# Patient Record
Sex: FEMALE | Race: WHITE | Employment: FULL TIME | URBAN - METROPOLITAN AREA
[De-identification: names, ages, dates, MRNs, and addresses within clinical notes are randomized per-mention and may not be internally consistent; named-entity substitution may affect disease eponyms.]

---

## 2019-06-21 ENCOUNTER — OFFICE VISIT (OUTPATIENT)
Dept: FAMILY MEDICINE CLINIC | Age: 24
End: 2019-06-21
Payer: COMMERCIAL

## 2019-06-21 VITALS
WEIGHT: 142 LBS | HEIGHT: 62 IN | OXYGEN SATURATION: 99 % | BODY MASS INDEX: 26.13 KG/M2 | DIASTOLIC BLOOD PRESSURE: 74 MMHG | SYSTOLIC BLOOD PRESSURE: 121 MMHG | HEART RATE: 94 BPM

## 2019-06-21 DIAGNOSIS — R00.2 PALPITATIONS: ICD-10-CM

## 2019-06-21 DIAGNOSIS — I47.1 AVNRT (AV NODAL RE-ENTRY TACHYCARDIA) (HCC): ICD-10-CM

## 2019-06-21 DIAGNOSIS — Z00.00 ROUTINE GENERAL MEDICAL EXAMINATION AT A HEALTH CARE FACILITY: Primary | ICD-10-CM

## 2019-06-21 DIAGNOSIS — R01.1 SYSTOLIC MURMUR: ICD-10-CM

## 2019-06-21 PROCEDURE — 99385 PREV VISIT NEW AGE 18-39: CPT | Performed by: FAMILY MEDICINE

## 2019-06-21 ASSESSMENT — PATIENT HEALTH QUESTIONNAIRE - PHQ9
1. LITTLE INTEREST OR PLEASURE IN DOING THINGS: 0
SUM OF ALL RESPONSES TO PHQ QUESTIONS 1-9: 0
SUM OF ALL RESPONSES TO PHQ9 QUESTIONS 1 & 2: 0
SUM OF ALL RESPONSES TO PHQ QUESTIONS 1-9: 0
2. FEELING DOWN, DEPRESSED OR HOPELESS: 0

## 2019-06-21 NOTE — PATIENT INSTRUCTIONS
1) See when you received varicella vaccination(s). Let your PCP know by phone or mychart message. Tram  [address], [address], [address]  Phone: [phone] Fax: [phone]  Mon.-Fri. 7 a.m.-5 p.m. Sat. 8 a.m.-Noon    [facility]  [address]  [facility]  Phone: [phone]  Mon.-Fri. 7:30 a.m.-4 p.m. [facility]  [address], [facility] 70  Phone: [phone]    Fax: [phone]  Monday-Friday 8:00 a.m.- 4:30 p.m. 1418 [facility] and Ur[facility]  [facility], 1301 [facility], 6500 [facility] [address]  Phone: [phone]  Mon.-Fri. 8 a.m.-8 p.m. Sat. 9 a.m.-5 p.m. [address]  [facility]. [address]  Phone: [phone] Fax: [phone]  Mon.-Fri. 8 a.m.-5 p.m. [place]  [address] [place] [address]  Phone: [phone] Fax: [phone]  Mon.-Fri. 7:30 a.m.-4 p.m. [address], [address] [place] [address]  Phone: [phone]  Mon., Tue., Thu., Fri. 7:30 a.m.-5 p.m.  Kiesha Pereira. 7:30 [facility].-[facility]  [address] [facility] [address]  [address]  Phone: [phone] Fax: [phone]  Mon.-Fri. 7:30 a.m.-4 p.m. [facility]  [address]  Phone: [phone] Fax: [phone]  Mon.-Fri. 8 a.m.-4:30 p.m. 506 [facility] and [facility] Services  [address], 9[facility], 2550 [facility]  Phone: [phone] Fax: [phone]  Mon.-Fri. 8 a.m.-4:30 p.m. 1315 [facility] and [facility]  [address] [place] [address], [address]  Phone: [phone] Fax: [phone]  Mon.-Fri. 7 a.m.-5 p.m. Sat. 8 a.m.-4 p.m.     [address]  Phone: [phone]  Mon.-Fri. 7:30 a.m.-4 receiving high quality medical treatment.  Your time in completing this survey is greatly appreciated

## 2019-06-21 NOTE — PROGRESS NOTES
[facility]  Progress Note  Quan Barry DO          Raphael Rasheed  1995 06/21/19    Chief Complaint:   Raphael Rasheed is a 26 y.o. female who is here to establish    HPI:   In [employer] school. Known AVNRT. Takes metoprolol. Up to 120 bpm at times. Told during her PA physical exam skills that she has a grade II ADRIENNE. Sometimes experiencing more fatigue. She thinks it might be related to her stress and lack of restful sleep in [place] school. Metoprolol helps. ROS negative for headache, visionchanges, chest pain, shortness of breath, abdominal pain, urinary sx, bowel changes. Past medical, surgical, and social history reviewed. and allergies reviewed. Allergies   Allergen Reactions    Doxycycline Nausea And Vomiting     Prior to Visit Medications    Medication Sig Taking? Authorizing Provider   Norgestim-Eth Estrad Triphasic (TRI-SPRINTEC PO) Take by mouth Yes Historical Provider, MD   metoprolol tartrate (LOPRESSOR) 25 MG tablet Take 1 tablet by mouth 2 times daily as needed (fast heart rate.) Yes Justyn Shoulders Bingcang, DO          Vitals:    06/21/19 1353   BP: 121/74   Pulse: 94   SpO2: 99%   Weight: 142 lb (64.4 kg)   Height: 5' 2\" (1.575 m)      Wt Readings from Last 3 Encounters:   06/21/19 142 lb (64.4 kg)     BP Readings from Last 3 Encounters:   06/21/19 121/74       There is no problem list on file for this patient.       Immunization History   Administered Date(s) Administered    DTaP 1996, 02/03/1997, 04/17/1997, 11/09/1998, 08/22/2001    HIB PRP-T (ActHIB, Hiberix) 1996, 02/03/1997, 04/17/1997, 01/30/1998    HPV Quadrivalent (Gardasil) 08/22/2014    Hepatitis B 1995, 1995, 04/17/1997    MMR 10/16/1997, 08/22/2001    Meningococcal MPSV4 (Menomune) 08/22/2014    PPD Test 10/16/1997, 08/22/2001    Polio IPV (IPOL) 1996, 02/03/1997, 04/17/1997, 08/22/2001    Tdap (Boostrix, Adacel) 08/22/2014       Past Medical History: Diagnosis Date    AVNRT (AV eduar re-entry tachycardia) ([facility] 2016    Neutropenia ([facility] 2016    Scoliosis 2018    SI (sacroiliac) joint dysfunction 2018     Past Surgical History:   Procedure Laterality Date    LYMPH NODE DISSECTION  2016    2017    OTHER SURGICAL HISTORY  2016    electrophysiology    TONSILLECTOMY      TONSILLECTOMY AND ADENOIDECTOMY  2002     Family History   Problem Relation Age of Onset    Asthma Sister     Asthma Brother     COPD Maternal Grandmother     Heart Failure Maternal Grandmother     Prostate Cancer Maternal Grandfather     Cancer Maternal Grandfather     Breast Cancer Paternal Grandmother     Diabetes Paternal Grandfather      Social History     Socioeconomic History    Marital status: Single     Spouse name: Not on file    Number of children: Not on file    Years of education: Not on file    Highest education level: Not on file   Occupational History    Not on file   Social Needs    Financial resource strain: Not on file    Food insecurity:     Worry: Not on file     Inability: Not on file    Transportation needs:     Medical: Not on file     Non-medical: Not on file   Tobacco Use    Smoking status: Never Smoker    Smokeless tobacco: Never Used   Substance and Sexual Activity    Alcohol use: Yes     Comment: soc    Drug use: No    Sexual activity: Yes     Partners: Male   Lifestyle    Physical activity:     Days per week: Not on file     Minutes per session: Not on file    Stress: Not on file   Relationships    Social connections:     Talks on phone: Not on file     Gets together: Not on file     Attends Restoration service: Not on file     Active member of club or organization: Not on file     Attends meetings of clubs or organizations: Not on file     Relationship status: Not on file    Intimate partner violence:     Fear of current or ex partner: Not on file     Emotionally abused: Not on file     Physically abused: Not on file     Forced sexual [place], [address]  Phone: [phone]    Fax: [phone]  Monday-Friday 8:00 a.m.- 4:30 p.m. 1418 [facility] and [facility] Care  [address], [address] [facility], 6[address] [facility] [address]  Phone: [phone]  Mon.-Fri. 8 a.m.-8 p.m. Sat. 9 a.m.-5 p.m. 6900 [facility]  [address] [facility] [address]  [facility]. [address]  Phone: [phone] Fax: [phone]  Mon.-Fri. 8 a.m.-5 p.m. [place]  [address] [place] [address]  Phone: [phone] Fax: [phone]  Mon.-Fri. 7:30 a.m.-4 p.m. [address], [address] [place] [address]  Phone: [phone]  Mon., Tue., Thu., Fri. 7:30 a.m.-5 p.m.  Quinn Dodd. 7:30 a.m.-[facility]  [address]  Phone: [phone] Fax: [phone]  Mon.-Fri. 7:30 a.m.-4 p.m. [facility]  [address], [address]  Phone: [phone] Fax: [phone]  Mon.-Fri. 8 a.m.-4:30 p.m. 506 [facility] and [facility]  [facility], 914 [facility], [facility]  Phone: [phone] Fax: [phone]  Mon.-Fri. 8 a.m.-4:30 p.m. [address] [place] [address] and Urgent Care  [address] [place] [address]  [place], [address]  Phone: [phone] Fax: [phone]  Mon.-Fri. 7 a.m.-5 p.m. Sat. 8 a.m.-4 p.m. [address]  Phone: [phone]  Mon.-Fri. 7:30 a.m.-4 p.m. 3364 [facility]  1139 [facility]  Phone: [phone] Fax: [phone]  Mon.-Fri. 7 a.m.-5 p.m. Sat. 8 a.m.-Noon [facility] [place], [address] [facility]  Phone: [phone] Fax: [phone]  Mon.-Fri. 7 a.m.-5 p.m.     [facility]  [address] [facility], 400 [facility]  Phone: [phone] Fax: [phone]  Mon.-Fri. 7 a.m.-5 p.m. [address] [employer]  [address] [place] [address], [address] [place] [address]  Phone: [phone]  Mon.-Fri. 6:30 a.m.-6 p.m. Sat. 7 a.m.-1 p.m. [facility], [facility], [place], [facility]  Phone: [phone]  Open 24/7    [address], [address]  Phone: [phone]  Mon.-Fri. 6 a.m.-7 p.m. Sat. 7 a.m.-3 p.m. [facility]  [address] [facility], [facility]  Phone: [phone]  Mon.-Fri. 6 a.m.-11 p.m.  Sat.-Sun. 6:30 a.m.-11 p.m. [place]  [address] [place]  [address] [facility], [facility], [address] [facility]  Phone: [phone]  Mon.-Fri. 8 a.m.-4 p.m. [address] [place] [address]. [facility], [address] [place] [address]  Phone: [phone]  Open 24/7    [facility] 7045, [place] [address]  Phone: [phone]  Mon.-Fri. 6:30 a.m.-6:30 p.m. Sat. 8 a.m.-Noon    1301 [facility], [facility] 9043  Phone: [phone] Fax: [phone]  Mon.-Fri. 8:30 a.m.-5 p.m. [facility]  [address] [facility], 119 [facility]  Phone: [phone] Fax: [phone]  Mon.-Fri. 8 a.m.-4:30 p.m. Please call ahead to check hours. You may be contacted by mail or e-mail to participate in a patient satisfaction survey regarding your office visit today. We value your opinion and depend on your feedback to make improvements and provide you with the best possible experience while receiving high quality medical treatment. Your time in completing this survey is greatly appreciated          Please note a portion of this chart was generated using dragon dictation software. Although every effort was made to ensure the accuracy of this automated transcription,some errors in transcription may have occurred.

## 2019-06-28 DIAGNOSIS — Z00.00 ROUTINE GENERAL MEDICAL EXAMINATION AT A HEALTH CARE FACILITY: ICD-10-CM

## 2019-06-28 LAB
A/G RATIO: 1.5 (ref 1.1–2.2)
ALBUMIN SERPL-MCNC: 4.7 G/DL (ref 3.4–5)
ALP BLD-CCNC: 39 U/L (ref 40–129)
ALT SERPL-CCNC: 14 U/L (ref 10–40)
ANION GAP SERPL CALCULATED.3IONS-SCNC: 15 MMOL/L (ref 3–16)
AST SERPL-CCNC: 19 U/L (ref 15–37)
BASOPHILS ABSOLUTE: 0 K/UL (ref 0–0.2)
BASOPHILS RELATIVE PERCENT: 0.7 %
BILIRUB SERPL-MCNC: 0.3 MG/DL (ref 0–1)
BUN BLDV-MCNC: 12 MG/DL (ref 7–20)
CALCIUM SERPL-MCNC: 9.8 MG/DL (ref 8.3–10.6)
CHLORIDE BLD-SCNC: 102 MMOL/L (ref 99–110)
CHOLESTEROL, TOTAL: 171 MG/DL (ref 0–199)
CO2: 23 MMOL/L (ref 21–32)
CREAT SERPL-MCNC: 0.5 MG/DL (ref 0.6–1.1)
EOSINOPHILS ABSOLUTE: 0.1 K/UL (ref 0–0.6)
EOSINOPHILS RELATIVE PERCENT: 2 %
GFR AFRICAN AMERICAN: >60
GFR NON-AFRICAN AMERICAN: >60
GLOBULIN: 3.1 G/DL
GLUCOSE BLD-MCNC: 77 MG/DL (ref 70–99)
HCT VFR BLD CALC: 40.4 % (ref 36–48)
HDLC SERPL-MCNC: 70 MG/DL (ref 40–60)
HEMOGLOBIN: 14.2 G/DL (ref 12–16)
LDL CHOLESTEROL CALCULATED: 91 MG/DL
LYMPHOCYTES ABSOLUTE: 2.3 K/UL (ref 1–5.1)
LYMPHOCYTES RELATIVE PERCENT: 55.3 %
MCH RBC QN AUTO: 33.3 PG (ref 26–34)
MCHC RBC AUTO-ENTMCNC: 35.2 G/DL (ref 31–36)
MCV RBC AUTO: 94.6 FL (ref 80–100)
MONOCYTES ABSOLUTE: 0.3 K/UL (ref 0–1.3)
MONOCYTES RELATIVE PERCENT: 7.8 %
NEUTROPHILS ABSOLUTE: 1.4 K/UL (ref 1.7–7.7)
NEUTROPHILS RELATIVE PERCENT: 34.2 %
PDW BLD-RTO: 12.4 % (ref 12.4–15.4)
PLATELET # BLD: 222 K/UL (ref 135–450)
PMV BLD AUTO: 8.3 FL (ref 5–10.5)
POTASSIUM SERPL-SCNC: 3.9 MMOL/L (ref 3.5–5.1)
RBC # BLD: 4.27 M/UL (ref 4–5.2)
SODIUM BLD-SCNC: 140 MMOL/L (ref 136–145)
TOTAL PROTEIN: 7.8 G/DL (ref 6.4–8.2)
TRIGL SERPL-MCNC: 49 MG/DL (ref 0–150)
TSH REFLEX: 1.26 UIU/ML (ref 0.27–4.2)
VLDLC SERPL CALC-MCNC: 10 MG/DL
WBC # BLD: 4.1 K/UL (ref 4–11)

## 2019-07-09 ENCOUNTER — HOSPITAL ENCOUNTER (OUTPATIENT)
Dept: NON INVASIVE DIAGNOSTICS | Age: 24
Discharge: HOME | End: 2019-07-09
Payer: COMMERCIAL

## 2019-07-09 LAB
LV EF: 53 %
LVEF MODALITY: NORMAL

## 2019-07-09 PROCEDURE — 93306 TTE W/DOPPLER COMPLETE: CPT

## 2019-07-10 ENCOUNTER — PATIENT MESSAGE (OUTPATIENT)
Dept: FAMILY MEDICINE CLINIC | Age: 24
End: 2019-07-10

## 2019-07-10 DIAGNOSIS — I49.9 IRREGULAR HEART BEAT: Primary | ICD-10-CM

## 2019-07-10 DIAGNOSIS — R42 POSTURAL DIZZINESS WITH PRESYNCOPE: ICD-10-CM

## 2019-07-10 DIAGNOSIS — R55 POSTURAL DIZZINESS WITH PRESYNCOPE: ICD-10-CM

## 2019-07-16 ENCOUNTER — HOSPITAL ENCOUNTER (OUTPATIENT)
Dept: NON INVASIVE DIAGNOSTICS | Age: 24
Discharge: HOME | End: 2019-07-16
Payer: COMMERCIAL

## 2019-07-16 DIAGNOSIS — I49.9 IRREGULAR HEART BEAT: ICD-10-CM

## 2019-07-16 PROCEDURE — 93225 XTRNL ECG REC<48 HRS REC: CPT

## 2019-07-16 PROCEDURE — 93226 XTRNL ECG REC<48 HR SCAN A/R: CPT

## 2019-07-25 ENCOUNTER — OFFICE VISIT (OUTPATIENT)
Dept: CARDIOLOGY CLINIC | Age: 24
End: 2019-07-25
Payer: COMMERCIAL

## 2019-07-25 VITALS
DIASTOLIC BLOOD PRESSURE: 70 MMHG | BODY MASS INDEX: 25.79 KG/M2 | WEIGHT: 141 LBS | HEART RATE: 115 BPM | SYSTOLIC BLOOD PRESSURE: 132 MMHG

## 2019-07-25 DIAGNOSIS — R00.0 TACHYCARDIA: ICD-10-CM

## 2019-07-25 DIAGNOSIS — I48.91 ATRIAL FIBRILLATION, UNSPECIFIED TYPE (HCC): Primary | ICD-10-CM

## 2019-07-25 PROCEDURE — 99203 OFFICE O/P NEW LOW 30 MIN: CPT | Performed by: INTERNAL MEDICINE

## 2019-07-25 PROCEDURE — 93000 ELECTROCARDIOGRAM COMPLETE: CPT | Performed by: INTERNAL MEDICINE

## 2019-07-25 NOTE — ASSESSMENT & PLAN NOTE
Had AVNRT ablation  Symptoms somewhat concerning for POTS  Will get tilt table  Continue metoprolol (may consider Nadolol)  ECG today sinus tach  Consider EP referral

## 2019-07-25 NOTE — LETTER
[facility]  EP Procedure Sheet  7/25/19    Art Lucero  1995    Physician: Dr. Brandon Wong     EP Procedures   Pacemaker implant  EP Study    ICD implant  Atrial flutter ablation     Biv implant  Atrial fibrillation ablation    Generator Change  SVT ablation    Lead revision  VT ablation    Lead extraction +/- upgrade  AVN ablation    Loop implant  Cardioversion    X Other: Tilt Table Test   CHERYL     Equipment   Medtronic   TRIXIE Mapping System    [place]  [address] [address]  CryoAblation    Biotronik  Laser Lead Extraction    Special Equipment       EP Procedures Scheduling Request  Time Requested  3:30   Specific Day 7/30/19   Anesthesia    CT surgery backup    Location [facility]     Pre-Procedure Labs / Imaging   PT/INR  Type & cross    CBC  Units PRBC    BMP/Mg  Units FFP    Venogram  CXR    Echo  Pulmonary CTA for Pulmonary vein mapping   .     Date of last admission: > 30 days

## 2019-07-25 NOTE — Clinical Note
She may have POTS, which is somewhat common after AVNRT ablation especially in younger patients. I asked her to take metoprolol every day, we will be getting a tilt table test and depending on what we find we may switch it to nadolol.  Thank you

## 2019-07-26 ENCOUNTER — PREP FOR PROCEDURE (OUTPATIENT)
Dept: CARDIOLOGY CLINIC | Age: 24
End: 2019-07-26

## 2019-07-26 LAB
ACQUISITION DURATION: NORMAL S
AVERAGE HEART RATE: 81 BPM
EKG DIAGNOSIS: NORMAL
HOLTER MAX HEART RATE: 160 BPM
HOOKUP DATE: NORMAL
HOOKUP TIME: NORMAL
MAX HEART RATE TIME/DATE: NORMAL
MIN HEART RATE TIME/DATE: NORMAL
MIN HEART RATE: 49 BPM
NUMBER OF QRS COMPLEXES: NORMAL
NUMBER OF SUPRAVENTRICULAR BEATS IN RUNS: 0
NUMBER OF SUPRAVENTRICULAR COUPLETS: 11
NUMBER OF SUPRAVENTRICULAR ECTOPICS: 59
NUMBER OF SUPRAVENTRICULAR ISOLATED BEATS: 37
NUMBER OF SUPRAVENTRICULAR RUNS: 0
NUMBER OF VENTRICULAR BEATS IN RUNS: 0
NUMBER OF VENTRICULAR BIGEMINAL CYCLES: 0
NUMBER OF VENTRICULAR COUPLETS: 0
NUMBER OF VENTRICULAR ECTOPICS: 9
NUMBER OF VENTRICULAR ISOLATED BEATS: 9
NUMBER OF VENTRICULAR RUNS: 0

## 2019-07-26 ASSESSMENT — ENCOUNTER SYMPTOMS
SHORTNESS OF BREATH: 0
BACK PAIN: 0
WHEEZING: 0
FACIAL SWELLING: 0
CHEST TIGHTNESS: 0
ABDOMINAL PAIN: 0
EYE DISCHARGE: 0
ABDOMINAL DISTENTION: 0
COUGH: 0
COLOR CHANGE: 0
BLOOD IN STOOL: 0
VOMITING: 0

## 2019-07-30 ENCOUNTER — HOSPITAL ENCOUNTER (OUTPATIENT)
Dept: CARDIAC CATH/INVASIVE PROCEDURES | Age: 24
Discharge: HOME | End: 2019-07-30
Attending: INTERNAL MEDICINE | Admitting: INTERNAL MEDICINE
Payer: COMMERCIAL

## 2019-07-30 VITALS — TEMPERATURE: 98 F | HEIGHT: 62 IN | BODY MASS INDEX: 25.95 KG/M2 | WEIGHT: 141 LBS

## 2019-07-30 PROCEDURE — 93660 TILT TABLE EVALUATION: CPT | Performed by: INTERNAL MEDICINE

## 2019-07-30 PROCEDURE — 93005 ELECTROCARDIOGRAM TRACING: CPT

## 2019-07-30 PROCEDURE — 93660 TILT TABLE EVALUATION: CPT

## 2019-07-31 LAB
EKG ATRIAL RATE: 94 BPM
EKG DIAGNOSIS: NORMAL
EKG P AXIS: 45 DEGREES
EKG P-R INTERVAL: 140 MS
EKG Q-T INTERVAL: 378 MS
EKG QRS DURATION: 84 MS
EKG QTC CALCULATION (BAZETT): 472 MS
EKG R AXIS: 71 DEGREES
EKG T AXIS: 42 DEGREES
EKG VENTRICULAR RATE: 94 BPM

## 2019-08-08 ENCOUNTER — OFFICE VISIT (OUTPATIENT)
Dept: CARDIOLOGY CLINIC | Age: 24
End: 2019-08-08
Payer: COMMERCIAL

## 2019-08-08 VITALS
SYSTOLIC BLOOD PRESSURE: 130 MMHG | OXYGEN SATURATION: 98 % | BODY MASS INDEX: 25.91 KG/M2 | HEART RATE: 91 BPM | HEIGHT: 62 IN | DIASTOLIC BLOOD PRESSURE: 68 MMHG | WEIGHT: 140.8 LBS

## 2019-08-08 DIAGNOSIS — G90.A POTS (POSTURAL ORTHOSTATIC TACHYCARDIA SYNDROME): ICD-10-CM

## 2019-08-08 DIAGNOSIS — R00.0 TACHYCARDIA: ICD-10-CM

## 2019-08-08 PROCEDURE — 99213 OFFICE O/P EST LOW 20 MIN: CPT | Performed by: INTERNAL MEDICINE

## 2019-08-08 ASSESSMENT — ENCOUNTER SYMPTOMS
CHEST TIGHTNESS: 0
BACK PAIN: 0
COUGH: 0
FACIAL SWELLING: 0
ABDOMINAL DISTENTION: 0
SHORTNESS OF BREATH: 0
BLOOD IN STOOL: 0
VOMITING: 0
ABDOMINAL PAIN: 0
COLOR CHANGE: 0
EYE DISCHARGE: 0
WHEEZING: 0

## 2019-08-08 NOTE — PROGRESS NOTES
06/28/2019     Lab Results   Component Value Date    LDLCALC 91 06/28/2019     Lab Results   Component Value Date    LABVLDL 10 06/28/2019     No results found for: CHOLHDLRATIO    No results found for: INR, PROTIME    The ASCVD Risk score (Rocio Griffin, et al., 2013) failed to calculate for the following reasons: The 2013 ASCVD risk score is only valid for ages 36 to 78      Imaging:       Last ECG (if available):  Sinus tach    Last Stress (if available):    Last Cath (if available):    Last TTE/CHERYL(if available): 7/9/19  Summary   Left ventricular cavity size is normal. Normal left ventricular wall   thickness. Overall left ventricular systolic function appears low normal   with an ejection fraction of 50-55%. No regional wall motion abnormalities   are noted. Normal diastolic function. Trivial tricuspid regurgitation.   Estimated pulmonary artery systolic pressure is at 25 mmHg assuming a right   atrial pressure of 3 mmHg. Last CMR  (if available):      Assessment / Plan:     Tachycardia  inappropriate sinus tach  Continue metoprolol    POTS (postural orthostatic tachycardia syndrome)  Continue Metoprolol 25mg bid  Stable  Referral to [facility] for possible EDS. TTE ok. I had the opportunity to review the clinical symptoms and presentation of Jina Ivory. Tobacco use was discussed with the patient and educated on the negative effects. I have asked the patient to not utilize these agents. All questions and concerns were addressed to the patient/family. Alternatives to my treatment were discussed. The note was completed using EMR. Every effort wasmade to ensure accuracy; however, inadvertent computerized transcription errors may be present. Thank you for allowing me to participate in thescarlett or Estefania Mccoy MD, [facility]

## 2019-08-12 ENCOUNTER — TELEPHONE (OUTPATIENT)
Dept: CARDIOLOGY CLINIC | Age: 24
End: 2019-08-12

## 2019-08-26 ENCOUNTER — PATIENT MESSAGE (OUTPATIENT)
Dept: FAMILY MEDICINE CLINIC | Age: 24
End: 2019-08-26

## 2019-08-26 RX ORDER — NORGESTIMATE AND ETHINYL ESTRADIOL 7DAYSX3 28
1 KIT ORAL DAILY
Qty: 28 TABLET | Refills: 5 | Status: SHIPPED | OUTPATIENT
Start: 2019-08-26 | End: 2020-02-10

## 2020-02-10 RX ORDER — NORGESTIMATE AND ETHINYL ESTRADIOL 7DAYSX3 28
1 KIT ORAL DAILY
Qty: 28 TABLET | Refills: 0 | Status: SHIPPED | OUTPATIENT
Start: 2020-02-10 | End: 2020-04-13

## 2020-02-10 RX ORDER — NORGESTIMATE AND ETHINYL ESTRADIOL 7DAYSX3 28
KIT ORAL
Qty: 28 TABLET | Refills: 0 | Status: SHIPPED | OUTPATIENT
Start: 2020-02-10 | End: 2020-02-10

## 2020-02-13 ASSESSMENT — ENCOUNTER SYMPTOMS
CHEST TIGHTNESS: 0
COLOR CHANGE: 0
COUGH: 0
VOMITING: 0
ABDOMINAL PAIN: 0
BLOOD IN STOOL: 0
FACIAL SWELLING: 0
EYE DISCHARGE: 0
WHEEZING: 0
ABDOMINAL DISTENTION: 0
SHORTNESS OF BREATH: 0
BACK PAIN: 0

## 2020-02-13 NOTE — PROGRESS NOTES
Norgestim-Eth Estrad Triphasic (TRI-SPRINTEC) 0.18/0.215/0.25 MG-35 MCG TABS Take 1 tablet by mouth daily 2/10/20  Yes Cintia Holcomb, DO   metoprolol tartrate (LOPRESSOR) 25 MG tablet TAKE 1 TABLET BY MOUTH TWICE DAILY AS NEEDED FOR FAST HEART RATE 12/19/19  Yes Cintia Polly Zhang, DO   Elastic Bandages & Supports ([address] ) [address] [facility] 1 each by Does not apply route daily 7/25/19  Yes Carlo Tamez MD        Review of Systems   Constitutional: Negative for activity change, appetite change, diaphoresis, fatigue, fever and unexpected weight change. HENT: Negative for congestion, facial swelling, mouth sores and nosebleeds. Eyes: Negative for discharge and visual disturbance. Respiratory: Negative for cough, chest tightness, shortness of breath and wheezing. Cardiovascular: Negative for chest pain, palpitations and leg swelling. Gastrointestinal: Negative for abdominal distention, abdominal pain, blood in stool and vomiting. Endocrine: Negative for cold intolerance, heat intolerance and polyuria. Genitourinary: Negative for difficulty urinating, dysuria, frequency and hematuria. Musculoskeletal: Negative for back pain, joint swelling, myalgias and neck pain. Skin: Negative for color change, pallor and rash. Allergic/Immunologic: Negative for immunocompromised state. Neurological: Negative for dizziness, syncope, weakness, light-headedness, numbness and headaches. Hematological: Negative for adenopathy. Does not bruise/bleed easily. Psychiatric/Behavioral: Negative for behavioral problems, confusion, decreased concentration and suicidal ideas. The patient is not nervous/anxious.         Vitals:    02/19/20 1137   BP: 110/70   Pulse: 73   SpO2:     Weight: 138 lb 3.2 oz (62.7 kg)       Vitals:    02/19/20 1125 02/19/20 1136 02/19/20 1137   BP: 112/68 (!) 110/90 110/70   Site: Left Upper Arm Left Upper Arm Left Upper Arm   Position: Supine Sitting Standing

## 2020-02-19 ENCOUNTER — OFFICE VISIT (OUTPATIENT)
Dept: CARDIOLOGY CLINIC | Age: 25
End: 2020-02-19
Payer: COMMERCIAL

## 2020-02-19 ENCOUNTER — NURSE ONLY (OUTPATIENT)
Dept: CARDIOLOGY CLINIC | Age: 25
End: 2020-02-19

## 2020-02-19 VITALS
HEIGHT: 61 IN | SYSTOLIC BLOOD PRESSURE: 110 MMHG | OXYGEN SATURATION: 97 % | BODY MASS INDEX: 26.09 KG/M2 | DIASTOLIC BLOOD PRESSURE: 70 MMHG | WEIGHT: 138.2 LBS | HEART RATE: 73 BPM

## 2020-02-19 PROBLEM — R00.2 PALPITATIONS: Status: ACTIVE | Noted: 2020-02-19

## 2020-02-19 PROCEDURE — 93000 ELECTROCARDIOGRAM COMPLETE: CPT | Performed by: INTERNAL MEDICINE

## 2020-02-19 PROCEDURE — 99214 OFFICE O/P EST MOD 30 MIN: CPT | Performed by: INTERNAL MEDICINE

## 2020-02-19 NOTE — ASSESSMENT & PLAN NOTE
Complains of increased in frequency and duration of palpitations. One episode where she felt she was close to passing out but not related to her prior symptoms related to POTS  Given her age and symptoms we will proceed with 1 month monitor. Continue metoprolol.

## 2020-02-27 ENCOUNTER — TELEPHONE (OUTPATIENT)
Dept: FAMILY MEDICINE CLINIC | Age: 25
End: 2020-02-27

## 2020-03-19 ENCOUNTER — OFFICE VISIT (OUTPATIENT)
Dept: FAMILY MEDICINE CLINIC | Age: 25
End: 2020-03-19
Payer: COMMERCIAL

## 2020-03-19 VITALS
HEART RATE: 104 BPM | TEMPERATURE: 98.2 F | BODY MASS INDEX: 25.73 KG/M2 | DIASTOLIC BLOOD PRESSURE: 73 MMHG | OXYGEN SATURATION: 99 % | RESPIRATION RATE: 18 BRPM | SYSTOLIC BLOOD PRESSURE: 122 MMHG | WEIGHT: 136.2 LBS

## 2020-03-19 PROCEDURE — 46600 DIAGNOSTIC ANOSCOPY SPX: CPT | Performed by: FAMILY MEDICINE

## 2020-03-19 PROCEDURE — 99213 OFFICE O/P EST LOW 20 MIN: CPT | Performed by: FAMILY MEDICINE

## 2020-03-19 RX ORDER — FAMOTIDINE 10 MG
40 TABLET ORAL 2 TIMES DAILY
COMMUNITY
End: 2021-02-25 | Stop reason: ALTCHOICE

## 2020-03-19 RX ORDER — CETIRIZINE HYDROCHLORIDE 10 MG/1
10 TABLET ORAL DAILY
COMMUNITY

## 2020-03-19 RX ORDER — HYDROCORTISONE ACETATE 25 MG/1
25 SUPPOSITORY RECTAL EVERY 12 HOURS
Qty: 20 SUPPOSITORY | Refills: 0 | Status: SHIPPED | OUTPATIENT
Start: 2020-03-19 | End: 2020-06-25 | Stop reason: SDUPTHER

## 2020-03-19 RX ORDER — DOCUSATE SODIUM 100 MG/1
100 CAPSULE, LIQUID FILLED ORAL DAILY
COMMUNITY
End: 2021-02-25 | Stop reason: ALTCHOICE

## 2020-03-19 NOTE — PROGRESS NOTES
Patient is here for issues with rectal bleeding and itching X 5 days. She has h/o anal  Fissures X 2 years , with intermittent bleeding - 3-4 days per week. H/o colonoscopy in 2016 in [facility]- normal or external hemorrhoids  Her bowel movements are usually 1x/day . Takes Colace once per day . She has h/o EDS. She is on H2 blocker - Zyrtec and Pepcid for Hives . Occasional heartburn/ indigestion , but usually attributes to alcohol. Cousin has Crohn's . Has used over the counter Hydrocortisone over the counter and Preparation H , but not every day and not with this flare. No fever. No abdominal pain. History of LAGOS , but  Stable. Has 1 coffee/ day. H/o sinus tachycardia. Last bowel movement was last night with slight blood with wiping only . None in toilet or in underwear. No significant weight changes. Review of Systems    ROS: All other systems were reviewed and are negative . Patient's allergies and medications were reviewed. Patient's past medical, surgical, social , and family history were reviewed. Wt Readings from Last 3 Encounters:   03/19/20 136 lb 3.2 oz (61.8 kg)   02/19/20 138 lb 3.2 oz (62.7 kg)   08/08/19 140 lb 12.8 oz (63.9 kg)     OBJECTIVE:  /73   Pulse 104   Temp 98.2 °F (36.8 °C) (Oral)   Resp 18   Wt 136 lb 3.2 oz (61.8 kg)   LMP 03/15/2020 (Exact Date)   SpO2 99%   Breastfeeding No   BMI 25.73 kg/m²     Physical Exam    General: NAD, cooperative, alert and oriented X 3. Mood / affect is good. good insight. well hydrated. Neck : no lymphadenopathy, supple, FROM  CV: Regular rate and rhythm , no murmurs/ rub/ gallop. No edema. Lungs : CTA bilaterally, breathing comfortably  Abdomen: positive bowel sounds, soft , non tender, non distended. No hepatosplenomegaly. No CVA tenderness. Rectal: anoscopy showed internal hemorrhoids . No active bleeding. No external hemorrhoids seen. Skin: no rashes. Non tender. ASSESSMENT/  PLAN:  1.  Internal hemorrhoids/2. Rectal bleeding  - push fluids - water. Add fiber supplement daily- Fibercon or Metamucil qd. - hydrocortisone (ANUSOL-HC) 25 MG suppository; Place 1 suppository rectally every 12 hours for 10 days  Dispense: 20 suppository; Refill: 0  - 47806 - [place] DIAGNOSTIC ANOSCOPY    F/u if no improvement 7-10d/ prn increased symptoms.

## 2020-03-19 NOTE — PATIENT INSTRUCTIONS
Patient Education        Hemorrhoids: Care Instructions  Your Care Instructions    Hemorrhoids are enlarged veins that develop in the anal canal. Bleeding during bowel movements, itching, swelling, and rectal pain are the most common symptoms. They can be uncomfortable at times, but hemorrhoids rarely are a serious problem. You can treat most hemorrhoids with simple changes to your diet and bowel habits. These changes include eating more fiber and not straining to pass stools. Most hemorrhoids do not need surgery or other treatment unless they are very large and painful or bleed a lot. Follow-up care is a key part of your treatment and safety. Be sure to make and go to all appointments, and call your doctor if you are having problems. It's also a good idea to know your test results and keep a list of the medicines you take. How can you care for yourself at home? · Sit in a few inches of warm water (sitz bath) 3 times a day and after bowel movements. The warm water helps with pain and itching. · Put ice on your anal area several times a day for 10 minutes at a time. Put a thin cloth between the ice and your skin. Follow this by placing a warm, wet towel on the area for another 10 to 20 minutes. · Take pain medicines exactly as directed. ? If the doctor gave you a prescription medicine for pain, take it as prescribed. ? If you are not taking a prescription pain medicine, ask your doctor if you can take an over-the-counter medicine. · Keep the anal area clean, but be gentle. Use water and a fragrance-free soap, such as [place], or use baby wipes or medicated pads, such as Tucks. · Wear cotton underwear and loose clothing to decrease moisture in the anal area. · Eat more fiber. Include foods such as whole-grain breads and cereals, raw vegetables, raw and dried fruits, and beans. · Drink plenty of fluids, enough so that your urine is light yellow or clear like water.  If you have kidney, heart, or liver disease and have to limit fluids, talk with your doctor before you increase the amount of fluids you drink. · Use a stool softener that contains bran or psyllium. You can save money by buying bran or psyllium (available in bulk at most health food stores) and sprinkling it on foods or stirring it into fruit juice. Or you can use a product such as Metamucil or Hydrocil. · Practice healthy bowel habits. ? Go to the bathroom as soon as you have the urge. ? Avoid straining to pass stools. Relax and give yourself time to let things happen naturally. ? Do not hold your breath while passing stools. ? Do not read while sitting on the toilet. Get off the toilet as soon as you have finished. · Take your medicines exactly as prescribed. Call your doctor if you think you are having a problem with your medicine. When should you call for help? Call 911 anytime you think you may need emergency care. For example, call if:    · You pass maroon or very bloody stools.    Call your doctor now or seek immediate medical care if:    · You have increased pain.     · You have increased bleeding.    Watch closely for changes in your health, and be sure to contact your doctor if:    · Your symptoms have not improved after 3 or 4 days. Where can you learn more? Go to [email or web address]://[email or web address]. org and sign in to your [employer] account. Enter U353 in the [facility] box to learn more about \"Hemorrhoids: Care Instructions. \"     If you do not have an account, please click on the \"Sign Up Now\" link. Current as of: August 11, 2019Content Version: 12.4  © 8202-8478 Healthwise, Incorporated. Care instructions adapted under license by [facility] ([facility]). If you have questions about a medical condition or this instruction, always ask your healthcare professional. [place] [address] any warranty or liability for your use of this information.

## 2020-03-26 PROCEDURE — 93272 ECG/REVIEW INTERPRET ONLY: CPT | Performed by: INTERNAL MEDICINE

## 2020-04-14 ENCOUNTER — OFFICE VISIT (OUTPATIENT)
Dept: CARDIOLOGY CLINIC | Age: 25
End: 2020-04-14
Payer: COMMERCIAL

## 2020-04-14 ENCOUNTER — NURSE ONLY (OUTPATIENT)
Dept: CARDIOLOGY CLINIC | Age: 25
End: 2020-04-14

## 2020-04-14 VITALS
SYSTOLIC BLOOD PRESSURE: 118 MMHG | WEIGHT: 137 LBS | TEMPERATURE: 98.2 F | DIASTOLIC BLOOD PRESSURE: 80 MMHG | HEART RATE: 106 BPM | BODY MASS INDEX: 25.86 KG/M2 | HEIGHT: 61 IN

## 2020-04-14 PROCEDURE — 93000 ELECTROCARDIOGRAM COMPLETE: CPT | Performed by: INTERNAL MEDICINE

## 2020-04-14 PROCEDURE — 0296T PR EXT ECG > 48HR TO 21 DAY RCRD W/CONECT INTL RCRD: CPT | Performed by: INTERNAL MEDICINE

## 2020-04-14 PROCEDURE — 99204 OFFICE O/P NEW MOD 45 MIN: CPT | Performed by: INTERNAL MEDICINE

## 2020-04-14 NOTE — PROGRESS NOTES
ADENOIDECTOMY  2001       Allergies: Allergies   Allergen Reactions    Doxycycline Nausea And Vomiting    Gold Other (See Comments), Hives and Rash     Other reaction(s): Skin Discoloration  urticaria         Medication:   Prior to Admission medications    Medication Sig Start Date End Date Taking? Authorizing Provider   cetirizine (ZYRTEC) 10 MG tablet Take 10 mg by mouth daily   Yes Historical Provider, MD   famotidine (PEPCID) 10 MG tablet Take 40 mg by mouth 2 times daily   Yes Historical Provider, MD   docusate sodium (COLACE) 100 MG capsule Take 100 mg by mouth Daily   Yes Historical Provider, MD   Norgestim-Eth Estrad Triphasic (TRI-SPRINTEC) 0.18/0.215/0.25 MG-35 MCG TABS Take 1 tablet by mouth daily 2/10/20  Yes Tatayna Morrell, DO   metoprolol tartrate (LOPRESSOR) 25 MG tablet TAKE 1 TABLET BY MOUTH TWICE DAILY AS NEEDED FOR FAST HEART RATE 19  Yes Jerman Holcomb, DO   Elastic Bandages & Supports ([facility]) [facility] 1 each by Does not apply route daily 19  Yes Анна Barcenas MD       Social History:   reports that she has never smoked. She has never used smokeless tobacco. She reports current alcohol use. She reports that she does not use drugs. Family History:  family history includes Asthma in her brother and sister; Breast Cancer in her paternal grandmother; COPD in her maternal grandmother; Cancer in her maternal grandfather; Diabetes in her paternal grandfather; Heart Failure in her maternal grandmother; Prostate Cancer in her maternal grandfather. Reviewed.  Denies family history of sudden cardiac death, arrhythmia, premature CAD    Review of System:    · General ROS: negative for - chills, fever   · Psychological ROS: negative for - anxiety or depression  · Ophthalmic ROS: negative for - eye pain or loss of vision  · ENT ROS: negative for - epistaxis, headaches, nasal discharge, sore throat   · Allergy and Immunology ROS: negative for - hives, nasal congestion   · Hematological and Lymphatic ROS: negative for - bleeding problems, blood clots, bruising or jaundice  · Endocrine ROS: negative for - skin changes, temperature intolerance or unexpected weight changes  · Respiratory ROS: negative for - cough, hemoptysis, pleuritic pain, SOB, sputum changes or wheezing  · Cardiovascular ROS: Per HPI. · Gastrointestinal ROS: negative for - abdominal pain, blood in stools, diarrhea, hematemesis, melena, nausea/vomiting or swallowing difficulty/pain  · Genito-Urinary ROS: negative for - dysuria or incontinence  · Musculoskeletal ROS: negative for - joint swelling or muscle pain  · Neurological ROS: negative for - confusion, dizziness, gait disturbance, headaches, numbness/tingling, seizures, speech problems, tremors, visual changes or weakness  · Dermatological ROS: negative for - rash    Physical Examination:  Vitals:    04/14/20 1427   BP: 118/80   Temp: 98.2 °F (36.8 °C)       · Constitutional: Oriented. No distress. · Head: Normocephalic and atraumatic. · Mouth/Throat: Oropharynx is clear and moist.   · Eyes: Conjunctivae normal. EOM are normal.   · Neck: Normal range of motion. Neck supple. No rigidity. No JVD present. · Cardiovascular: Normal rate, regular rhythm, S1&S2 and intact distal pulses. · Pulmonary/Chest: Bilateral respiratory sounds. No wheezes. No rhonchi. · -Abdominal: Soft. Bowel sounds present. No distension, No tenderness. · Musculoskeletal: No tenderness. No edema    · Lymphadenopathy: Has no cervical adenopathy. · Neurological: Alert and oriented. Cranial nerve appears intact, No Gross deficit   · Skin: Skin is warm and dry. No rash noted. · Psychiatric: Has a normal mood, affect and behavior     Labs:  Reviewed. ECG: reviewed, Sinus  Rhythm, with QRS duration 62 ms. No pathologic Q waves, ventricular pre-excitation, or QT prolongation. Studies:   1.  Holter (7/16/19):  SR/SA with average HR 81 (), rare PAC's, rare PVC's      30 day Event monitor (2/19-3/20/20):  SR with unifocal, symptomatic PVC's, blocked PACs, and nonsustained SVT    2. Echo 7/9/19:    Summary   Left ventricular cavity size is normal. Normal left ventricular wall   thickness. Overall left ventricular systolic function appears low normal   with an ejection fraction of 50-55%. No regional wall motion abnormalities   are noted. Normal diastolic function. Trivial tricuspid regurgitation. Estimated pulmonary artery systolic pressure is at 25 mmHg assuming a right   atri/al pressure of 3 mmHg. 3. Stress Test:  none    4. Cath:  none    The MCOT, echocardiogram, stress test, and coronary angiography/PCI were reviewed by myself and used for my plan of care. Procedures:  1. Assessment/Plan:  1. Syncope  2. Palpitations  3. POTS  4.  AVNRT s/p ablation 2017  5. PVC's    She had a slow pathway modification in 2017. In addition to this, she was also noted to have very sensitive sinus node during the procedure. Currently, her symptoms are a bit different and more consistent with PVCs. Monitor showed unifocal, symptomatic PVC's. Unable to determine burden as this was an event monitor. Obtain 14 day Zio to assess PVC burden, morphology, and behavior. Start Verapamil 120 mg daily as they are mostly outflow tract morphology. Discussed about pots and vasovagal symptoms in detail. Discussed about the lifestyle modifications. Repeat echo in 2 months prior to OV     Follow up in 2 months    Thank you for allowing me to participate in the care of Hilda Odonnell. All questions and concerns were addressed to the patient/family. Alternatives to my treatment were discussed. Leonides Habermann, RN, am scribing for and in the presence of Dr. Adolph Noland.  04/14/20 2:41 PM  Pinky Hsu RN    I, Rajesh Pedraza MD, personally performed the services prescribed in this documentation as scribed by Ms. Baiele Dsouza Kamilah Rosenthal RN in my presence and it is both accurate and complete. Total time spent 45 minutes.     Gary Gardner MD  Cardiac Electrophysiology  [facility]

## 2020-04-16 RX ORDER — NORGESTIMATE AND ETHINYL ESTRADIOL 7DAYSX3 28
KIT ORAL
Qty: 28 TABLET | Refills: 2 | Status: SHIPPED | OUTPATIENT
Start: 2020-04-16 | End: 2020-07-15

## 2020-04-17 ENCOUNTER — PATIENT MESSAGE (OUTPATIENT)
Dept: CARDIOLOGY CLINIC | Age: 25
End: 2020-04-17

## 2020-04-17 NOTE — TELEPHONE ENCOUNTER
KV,  Please advise. Pt recently seen on 4/14/20, new patient, UL. (referred by [facility] d/t Palpitations. )  14 ZIO placed on pt-4/14/20. Postive Tilt table study for POTS-7/30/19. Started on Metoprolol. ECHO 7/9/19- EF 50-55%  Recently dx w/EDS    UL started pt on Verapamil 120 mg Daily, repeat Echo in 2 months prior to OV.

## 2020-05-13 PROCEDURE — 0298T PR EXT ECG > 48HR TO 21 DAY REVIEW AND INTERPRETATN: CPT | Performed by: INTERNAL MEDICINE

## 2020-05-21 ENCOUNTER — PATIENT MESSAGE (OUTPATIENT)
Dept: FAMILY MEDICINE CLINIC | Age: 25
End: 2020-05-21

## 2020-05-21 NOTE — TELEPHONE ENCOUNTER
From: Hua France  To: Adilene Power DO  Sent: 5/21/2020  2:01 PM EDT  Subject: Test Results Question    Hello,    I was wondering if I could have the full report of my echocardiogram from July 2019 released to me on my chart?     Thank you,  Mishel Zhou   [phone]

## 2020-05-28 ENCOUNTER — PATIENT MESSAGE (OUTPATIENT)
Dept: FAMILY MEDICINE CLINIC | Age: 25
End: 2020-05-28

## 2020-06-16 ENCOUNTER — PROCEDURE VISIT (OUTPATIENT)
Dept: CARDIOLOGY CLINIC | Age: 25
End: 2020-06-16
Payer: COMMERCIAL

## 2020-06-16 LAB
LV EF: 58 %
LVEF MODALITY: NORMAL

## 2020-06-16 PROCEDURE — 93306 TTE W/DOPPLER COMPLETE: CPT | Performed by: INTERNAL MEDICINE

## 2020-06-16 NOTE — PROGRESS NOTES
[facility]   Cardiac Electrophysiology Consultation   Date: 6/23/2020  Reason for Consultation:  POTS  Consult Requesting Physician: No att. providers found     Chief Complaint:   Chief Complaint   Patient presents with    3 Month Follow-Up    Palpitations      HPI: Gerardo Treviño is a 22 y.o. referred by Dr. David Strange for long h/o palpitations. PMH significant for AVNRT s/p ablation 2017 and syncope. Patient states that she will be tachycardic upon standing, resolves after sitting down or laying in bed the tachycardia will resolve. No other symptoms associated with palpitations and tachycardia. No particular triggering factors. Feels very different from her prior episodes of SVT for which she had an ablation in the past.  S/p positive tilt table study for POTS (7/30/19). She was started on metoprolol. 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 Due to complaints of worsening palpitations, she wore an event monitor (2/2020) that showed unifocal, symptomatic PVC's, but unable to determine burden. Verapamil was started at last OV (4/2020). Interval History: Today, she is here to review results of testing, presenting in [address] [place] [address]. After starting the verapamil, she wore a 14 day monitor (4/14-4/28/20) that showed SR with symptoms correlating with SR and occasional PVCs (<1%). Corlanor 5 mg BID was started while she was wearing the monitor for c/o HR up to 160's with minimal activity. Echo (6/2020) was normal.  She states that she hasn't felt any palpitations in the past several weeks. She reports her HR has been running in the 80's. One evening, she was laying on the couch and felt palpitations. Recordings from her Apple watch reported concerning for AF. Upon review of the tracings, it appears to be an intranodal re-entery tachycardia (he can clearly see a P wave with a different morphology). 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     Past Medical History:   Diagnosis Date Palpitations  3. POTS  4.  AVNRT s/p ablation 2017  5. PVC's  6. IST    She had a slow pathway modification in 2017. In addition to this, she was also noted to have very sensitive sinus node during the procedure. Currently, her symptoms are a bit different and more consistent with PVCs. Monitor showed unifocal, symptomatic PVC's. Unable to determine burden as this was an event monitor. On Verapamil and Corlanor as PVC's are mostly outflow tract morphology. Her palpitations have improved since starting these medications. However, she does have occasional palpitations still, review of Apple watch have shown intranodal re-entery tachycardia. Normal echo  Discussed about the treatment options including lifestyle modifications  As the patient is highly symptomatic, will add flecainide 50 mg BID  Will assess ECG in 7-10 days to assess MT and QRS duration  Continue verapamil 120 mg daily and Corlanor 5 mg BID    Follow up in 6 months with Kyle Ng NP    Thank you for allowing me to participate in the care of Albin Gonzalez. All questions and concerns were addressed to the patient/family. Alternatives to my treatment were discussed. Isha Lee RN, am scribing for and in the presence of Dr. Maxine Molina. 06/23/20 9:48 AM  Israel Carbajal RN    I, Diana Humphries MD, personally performed the services prescribed in this documentation as scribed by Ms. Israel Carbajal RN in my presence and it is both accurate and complete.      Diana Humphries MD  Cardiac Electrophysiology  [facility]

## 2020-06-23 ENCOUNTER — OFFICE VISIT (OUTPATIENT)
Dept: CARDIOLOGY CLINIC | Age: 25
End: 2020-06-23
Payer: COMMERCIAL

## 2020-06-23 VITALS
WEIGHT: 139.2 LBS | SYSTOLIC BLOOD PRESSURE: 122 MMHG | BODY MASS INDEX: 26.28 KG/M2 | TEMPERATURE: 98.4 F | DIASTOLIC BLOOD PRESSURE: 70 MMHG | HEIGHT: 61 IN | HEART RATE: 112 BPM

## 2020-06-23 PROCEDURE — 99214 OFFICE O/P EST MOD 30 MIN: CPT | Performed by: INTERNAL MEDICINE

## 2020-06-23 PROCEDURE — 93000 ELECTROCARDIOGRAM COMPLETE: CPT | Performed by: INTERNAL MEDICINE

## 2020-06-23 RX ORDER — FLECAINIDE ACETATE 50 MG/1
50 TABLET ORAL 2 TIMES DAILY
Qty: 60 TABLET | Refills: 5 | Status: SHIPPED | OUTPATIENT
Start: 2020-06-23 | End: 2021-01-24 | Stop reason: SDUPTHER

## 2020-06-25 ENCOUNTER — OFFICE VISIT (OUTPATIENT)
Dept: FAMILY MEDICINE CLINIC | Age: 25
End: 2020-06-25
Payer: COMMERCIAL

## 2020-06-25 VITALS
TEMPERATURE: 98.6 F | HEIGHT: 61 IN | WEIGHT: 136.8 LBS | DIASTOLIC BLOOD PRESSURE: 72 MMHG | BODY MASS INDEX: 25.83 KG/M2 | OXYGEN SATURATION: 96 % | HEART RATE: 82 BPM | SYSTOLIC BLOOD PRESSURE: 116 MMHG

## 2020-06-25 PROCEDURE — 99395 PREV VISIT EST AGE 18-39: CPT | Performed by: FAMILY MEDICINE

## 2020-06-25 RX ORDER — HYDROCORTISONE ACETATE 25 MG/1
25 SUPPOSITORY RECTAL EVERY 12 HOURS
Qty: 12 SUPPOSITORY | Refills: 1 | Status: SHIPPED | OUTPATIENT
Start: 2020-06-25 | End: 2020-07-05

## 2020-06-25 ASSESSMENT — PATIENT HEALTH QUESTIONNAIRE - PHQ9
SUM OF ALL RESPONSES TO PHQ9 QUESTIONS 1 & 2: 0
SUM OF ALL RESPONSES TO PHQ QUESTIONS 1-9: 0
1. LITTLE INTEREST OR PLEASURE IN DOING THINGS: 0
2. FEELING DOWN, DEPRESSED OR HOPELESS: 0
SUM OF ALL RESPONSES TO PHQ QUESTIONS 1-9: 0

## 2020-06-29 LAB
C. TRACHOMATIS DNA ,URINE: NEGATIVE
N. GONORRHOEAE DNA, URINE: NEGATIVE

## 2020-06-30 ENCOUNTER — NURSE ONLY (OUTPATIENT)
Dept: CARDIOLOGY CLINIC | Age: 25
End: 2020-06-30
Payer: COMMERCIAL

## 2020-06-30 ENCOUNTER — PATIENT MESSAGE (OUTPATIENT)
Dept: FAMILY MEDICINE CLINIC | Age: 25
End: 2020-06-30

## 2020-06-30 PROCEDURE — 93000 ELECTROCARDIOGRAM COMPLETE: CPT | Performed by: INTERNAL MEDICINE

## 2020-06-30 NOTE — TELEPHONE ENCOUNTER
From: Jeanne Diop  To: Carlene Churchill DO  Sent: 6/30/2020 12:51 PM EDT  Subject: Visit Follow-Up Question    Hello,    Attached is my vaccination record that Dr. Ariadne Taylor requested.     Thank you,  Kevon Brown

## 2020-07-15 RX ORDER — NORGESTIMATE AND ETHINYL ESTRADIOL 7DAYSX3 28
KIT ORAL
Qty: 28 TABLET | Refills: 2 | Status: SHIPPED | OUTPATIENT
Start: 2020-07-15 | End: 2020-10-05

## 2020-08-01 ENCOUNTER — PATIENT MESSAGE (OUTPATIENT)
Dept: FAMILY MEDICINE CLINIC | Age: 25
End: 2020-08-01

## 2020-08-04 ENCOUNTER — OFFICE VISIT (OUTPATIENT)
Dept: PRIMARY CARE CLINIC | Age: 25
End: 2020-08-04
Payer: COMMERCIAL

## 2020-08-04 PROCEDURE — 99211 OFF/OP EST MAY X REQ PHY/QHP: CPT | Performed by: NURSE PRACTITIONER

## 2020-08-04 NOTE — PATIENT INSTRUCTIONS
Advance Care Planning  People with COVID-19 may have no symptoms, mild symptoms, such as fever, cough, and shortness of breath or they may have more severe illness, developing severe and fatal pneumonia. As a result, Advance Care Planning with attention to naming a health care decision maker (someone you trust to make healthcare decisions for you if you could not speak for yourself) and sharing other health care preferences is important BEFORE a possible health crisis. Please contact your Primary Care Provider to discuss Advance Care Planning. Preventing the Spread of Coronavirus Disease 2019 in Homes and Residential Communities  For the most recent information go to [email or web address]    Prevention steps for People with confirmed or suspected COVID-19 (including persons under investigation) who do not need to be hospitalized  and   People with confirmed COVID-19 who were hospitalized and determined to be medically stable to go home    Your healthcare provider and public health staff will evaluate whether you can be cared for at home. If it is determined that you do not need to be hospitalized and can be isolated at home, you will be monitored by staff from your local or state health department. You should follow the prevention steps below until a healthcare provider or local or state health department says you can return to your normal activities. Stay home except to get medical care  People who are mildly ill with COVID-19 are able to isolate at home during their illness. You should restrict activities outside your home, except for getting medical care. Do not go to work, school, or public areas. Avoid using public transportation, ride-sharing, or taxis. Separate yourself from other people and animals in your home  People: As much as possible, you should stay in a specific room and away from other people in your home.  Also, you should use a separate bathroom, if available. Animals: You should restrict contact with pets and other animals while you are sick with COVID-19, just like you would around other people. Although there have not been reports of pets or other animals becoming sick with COVID-19, it is still recommended that people sick with COVID-19 limit contact with animals until more information is known about the virus. When possible, have another member of your household care for your animals while you are sick. If you are sick with COVID-19, avoid contact with your pet, including petting, snuggling, being kissed or licked, and sharing food. If you must care for your pet or be around animals while you are sick, wash your hands before and after you interact with pets and wear a facemask. Call ahead before visiting your doctor  If you have a medical appointment, call the healthcare provider and tell them that you have or may have COVID-19. This will help the healthcare providers office take steps to keep other people from getting infected or exposed. Wear a facemask  You should wear a facemask when you are around other people (e.g., sharing a room or vehicle) or pets and before you enter a healthcare providers office. If you are not able to wear a facemask (for example, because it causes trouble breathing), then people who live with you should not stay in the same room with you, or they should wear a facemask if they enter your room. Cover your coughs and sneezes  Cover your mouth and nose with a tissue when you cough or sneeze. Throw used tissues in a lined trash can. Immediately wash your hands with soap and water for at least 20 seconds or, if soap and water are not available, clean your hands with an alcohol-based hand  that contains at least 60% alcohol.   Clean your hands often  Wash your hands often with soap and water for at least 20 seconds, especially after blowing your nose, coughing, or sneezing; going to the bathroom; and have a medical emergency and need to call 911, notify the dispatch personnel that you have, or are being evaluated for COVID-19. If possible, put on a facemask before emergency medical services arrive. Discontinuing home isolation  Patients with confirmed COVID-19 should remain under home isolation precautions until the risk of secondary transmission to others is thought to be low. The decision to discontinue home isolation precautions should be made on a case-by-case basis, in consultation with healthcare providers and state and local health departments.

## 2020-08-04 NOTE — PROGRESS NOTES
Maico Duran received a viral test for COVID-19. They were educated on isolation and quarantine as appropriate. For any symptoms, they were directed to seek care from their PCP, given contact information to establish with a doctor, directed to an urgent care or the emergency room.

## 2020-08-06 LAB — SARS-COV-2, NAA: NOT DETECTED

## 2020-10-05 RX ORDER — NORGESTIMATE AND ETHINYL ESTRADIOL 7DAYSX3 28
KIT ORAL
Qty: 28 TABLET | Refills: 2 | Status: SHIPPED | OUTPATIENT
Start: 2020-10-05 | End: 2021-01-07 | Stop reason: SDUPTHER

## 2020-10-05 NOTE — TELEPHONE ENCOUNTER
Requested Prescriptions     Pending Prescriptions Disp Refills    TRI-SPRINTEC 0.18/0.215/0.25 MG-35 MCG TABS [Pharmacy Med Name: TRI-SPRINTEC TABLETS 28'S] 28 tablet 2     Sig: TAKE 1 TABLET BY MOUTH DAILY     Marya Carlos

## 2020-10-07 NOTE — TELEPHONE ENCOUNTER
Requested Prescriptions     Pending Prescriptions Disp Refills    verapamil (CALAN SR) 120 MG extended release tablet 30 tablet 5     Sig: Take 1 tablet by mouth daily          Number: 30    Refills: 5    Last Office Visit: 6/23/2020     Next Office Visit: 12/23/2020

## 2020-10-27 RX ORDER — IVABRADINE 5 MG/1
TABLET, FILM COATED ORAL
Qty: 180 TABLET | Refills: 3 | Status: SHIPPED | OUTPATIENT
Start: 2020-10-27 | End: 2021-08-18 | Stop reason: SDUPTHER

## 2020-12-21 NOTE — PROGRESS NOTES
[facility]   Electrophysiology  Office Visit  Date: 12/23/2020    Chief Complaint   Patient presents with    Loss of Consciousness    Tachycardia    Palpitations       Cardiac HX: Gladys Matta is a 26 y. o. woman with a h/o syncope, palpitations and SVT, s/p RFA for AVNRT (2017), s/p HUTTT that was consistent with POTS, placed on BB, dx'd with EDS, worsening palps, event monitor showed (2/2020) unifocal, symptomatic PVCs, placed on verapamil, 14 day monitor (4/14/20 - 4/28/20)showed NSR and occasional PVCs - <1%, placed on Corlanor, then added flecainide 50 mg BID. Interval History/HPI: Patient is here for follow-up for her palpitations, syncope, POTS and PVCs. Patient states that this combination of medications is working well for her. She states that she occasionally has 1 or 2 PVCs a day that she notes. She did have 1 day where she had taken a Unisom the night before and noted some runs of PVCs the following morning. This was an isolated episode she is not taking that again and not had the same symptoms. She is exercising, hydrating and feels that her symptoms are very well managed. She is asking today if she is going to get  in the next 6 months and plans to have a family in the future and wanted to know about her medications. Denies chest pain, shortness of breath, PND, orthopnea or lower extremity edema. 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     Home medications:   Current Outpatient Medications on File Prior to Visit   Medication Sig Dispense Refill    CORLANOR 5 MG TABS tablet TAKE 1 TABLET BY MOUTH TWICE DAILY WITH MEALS 180 tablet 3    verapamil (CALAN SR) 120 MG extended release tablet Take 1 tablet by mouth daily 30 tablet 5    TRI-SPRINTEC 0.18/0.215/0.25 MG-35 MCG TABS TAKE 1 TABLET BY MOUTH DAILY 28 tablet 2    flecainide (TAMBOCOR) 50 MG tablet Take 1 tablet by mouth 2 times daily 60 tablet 5    cetirizine (ZYRTEC) 10 MG tablet Take 10 mg by mouth daily      famotidine (PEPCID) 10 MG tablet Take 40 mg by mouth 2 times daily      Elastic Bandages & Supports (MEDICAL COMPRESSION STOCKINGS) MISC 1 each by Does not apply route daily 1 each 0    docusate sodium (COLACE) 100 MG capsule Take 100 mg by mouth Daily       No current facility-administered medications on file prior to visit. Past Medical History:   Diagnosis Date    AVNRT [facility]AV eduar re-entry tachycardia) ([facility] [address] 2016    EDS (Vianey-Danlos syndrome)     Inappropriate sinus tachycardia     Neutropenia ([facility] [address] 2016    PVC (premature ventricular contraction)     Scoliosis 2018    SI (sacroiliac) joint dysfunction 2018    Syncope         Past Surgical History:   Procedure Laterality Date    LYMPH NODE DISSECTION  2016    2017    OTHER SURGICAL HISTORY  2016    electrophysiology    TONSILLECTOMY      TONSILLECTOMY AND ADENOIDECTOMY  2002       Allergies   Allergen Reactions    Doxycycline Nausea And Vomiting    Gold Other (See Comments), Hives and Rash     Other reaction(s): Skin Discoloration  urticaria         Social History:  Reviewed. reports that she has never smoked. She has never used smokeless tobacco. She reports current alcohol use. She reports that she does not use drugs. Family History:  Reviewed. family history includes Asthma in her brother and sister; Breast Cancer in her paternal grandmother; COPD in her maternal grandmother; Cancer in her maternal grandfather; Diabetes in her paternal grandfather; Heart Failure in her maternal grandmother; Prostate Cancer in her maternal grandfather. Review of System:    · Constitutional: No fevers, chills. · Eyes: No visual changes or diplopia. No scleral icterus. · ENT: No Headaches. No mouth sores or sore throat. · Cardiovascular: No for chest pain, No for dyspnea on exertion, Yes for palpitations or No for loss of consciousness.  No cough, hemoptysis, No for pleuritic pain, or phlebitis. · Respiratory: No for cough or wheezing. No hematemesis. · Gastrointestinal: No abdominal pain, blood in stools. · Genitourinary: No dysuria, or hematuria. · Musculoskeletal: No gait disturbance,    · Integumentary: No rash or pruritis. · Neurological: No headache, change in muscle strength, numbness or tingling. · Psychiatric: No anxiety, or depression. · Endocrine: No temperature intolerance. No excessive thirst, fluid intake, or urination. · Hem/Lymph: No abnormal bruising or bleeding, blood clots or swollen lymph nodes. · Allergic/Immunologic: No nasal congestion or hives. Physical Examination:  Vitals:    12/23/20 1024   BP: 124/60   Pulse: 105   Temp: 97.7 °F (36.5 °C)         Wt Readings from Last 3 Encounters:   12/23/20 138 lb 3.2 oz (62.7 kg)   06/25/20 136 lb 12.8 oz (62.1 kg)   06/23/20 139 lb 3.2 oz (63.1 kg)       · Constitutional: Oriented. No distress. · Head: Normocephalic and atraumatic. · Mouth/Throat: Oropharynx is clear and moist.   · Eyes: Conjunctivae clear without jaunduice. PERRL. · Neck: Neck supple. No rigidity. No JVD present. · Cardiovascular: Normal rate, regular rhythm, S1&S2. · Pulmonary/Chest: Bilateral respiratory sounds. No wheezes, No rhonchi. · Abdominal: Soft. Bowel sounds present. No distension, No tenderness. · Musculoskeletal: No tenderness. No edema    · Lymphadenopathy: Has no cervical adenopathy. · Neurological: Alert and oriented. Cranial nerve appears intact, No Gross deficit   · Skin: Skin is warm and dry. No rash noted. · Psychiatric: Has a normal mood, affect and behavior     Labs:  Reviewed. No results for input(s): NA, K, CL, CO2, PHOS, BUN, CREATININE in the last 72 hours. Invalid input(s): CA,  TSH  No results for input(s): WBC, HGB, HCT, MCV, PLT in the last 72 hours.   No results found for: CKTOTAL, CKMB, CKMBINDEX, TROPONINI  No results found for: BNP  No results found for: PROTIME, INR  Lab Results   Component Value Date    CHOL 171 06/28/2019    HDL 70 06/28/2019    TRIG 49 06/28/2019       ECG: Personally reviewed: ST, , , QRS 90, QTc 406    ECHO:  6/16/2020  Summary   Left ventricular cavity size is normal. Normal left ventricular wall   thickness. Overall left ventricular systolic function appears normal.   Ejection fraction is visually estimated to be 55-60%. No regional wall   motion abnormalities are noted. Normal diastolic function. Trivial mitral and pulmonic regurgitation. Mild tricuspid regurgitation with an RVSP of 34mmHg. Stress Test: N/A    Cardiac Angiography: N/A    Problem List:   Patient Active Problem List    Diagnosis Date Noted    Palpitations 02/19/2020    Syncope     POTS (postural orthostatic tachycardia syndrome) 08/08/2019    Tachycardia 07/25/2019        Assessment:   1. Tachycardia    2. Palpitations    3. POTS (postural orthostatic tachycardia syndrome)    4. Syncope and collapse        Cardiac HX: Cr Escobedo is a 26 y. o. woman with a h/o syncope, palpitations and SVT, s/p RFA for AVNRT (2017), s/p HUTTT that was consistent with POTS, placed on BB, dx'd with EDS, worsening palps, event monitor showed (2/2020) unifocal, symptomatic PVCs, placed on verapamil, 14 day monitor (4/14/20 - 4/28/20)showed NSR and occasional PVCs - <1%, placed on Corlanor, then added flecainide 50 mg BID. Palpitations/PVCs/IST  - S/s controlled, occasional palpitations/PVCs  - On flecainide 50 mg twice daily - QRS 90  - Corlanor 5 mg twice daily, verapamil 120 mg daily  - ECG ordered and results personally reviewed     POTS  - S/s under control  - Lifestyle modification - hydration, exercise  - Handout given  - Have asked to log her symptoms to determine triggers    We will follow-up in 1 year and sooner if needed. EF of 55 to 77%  No systolic HF  No known CAD  No known AF     No Tobacco use.       All questions and concerns were addressed to the patient/family. Alternatives to my treatment were discussed. The note was completed using EMR. Every effort was made to ensure accuracy; however, inadvertent computerized transcription errors may be present. Patient received education regarding their diagnosis, treatment and medications while in the office today.       Jac Palacios [address] [facility]

## 2020-12-23 ENCOUNTER — OFFICE VISIT (OUTPATIENT)
Dept: CARDIOLOGY CLINIC | Age: 25
End: 2020-12-23
Payer: COMMERCIAL

## 2020-12-23 VITALS
WEIGHT: 138.2 LBS | TEMPERATURE: 97.7 F | BODY MASS INDEX: 26.09 KG/M2 | HEART RATE: 105 BPM | SYSTOLIC BLOOD PRESSURE: 124 MMHG | DIASTOLIC BLOOD PRESSURE: 60 MMHG | HEIGHT: 61 IN

## 2020-12-23 PROCEDURE — 99214 OFFICE O/P EST MOD 30 MIN: CPT | Performed by: NURSE PRACTITIONER

## 2020-12-23 PROCEDURE — 93000 ELECTROCARDIOGRAM COMPLETE: CPT | Performed by: NURSE PRACTITIONER

## 2020-12-23 NOTE — PATIENT INSTRUCTIONS
Neurocardiogenic syncope    Neurocardiogenic Syncope, Vasodepressive Syndrome, Postural Orthostatic Tachycardia Syndrome and Vasovagal Syncope-    All refer to the same phenomenon. Despite what name they go by, the cause and treatment are essentially the same, and for practical purposes, not worth mentioning. Below, they will be referred to as neurocardiogenic syncope. What is it? Neurocardiogenic Syncope is most commonly discovered in adolescence and an older adults. It is essentially a failure of the brain and the cardiovascular system (blood vessels) to adequately medicate and respond to each other. This is NOT a \"heart problem\" or \" heart defect\" nor is it a life-threatening illness. Because of the way we are made, is easiest for blood pool in the extremities do to gravity. It requires work (messages sent by the brain, contraction of the blood vessels, and pumping of the heart) to send blood to our heart and brain. There a number of forces that work against blood returning to the central part of the body which are the vital organs and the brain. These include: gravity, amount of fluid in the vessels, dilation of the blood vessels, neurotransmitters (chemicals in the brain that allow us to communicate) and even barometric pressure. In addition, people who have this problem tend to diurese (urinate or lose body water) more then they should. How Does It Happen? Normally when we stand up, our brain sends a signal to the blood vessels in our legs to open wider or dilate. Often, at the same time, a signal is sent to the heart to slow down. When the blood vessels in the legs dilate, blood pools in the legs so that the normal volume of blood does not return to the heart. If the heart doesn't pump enough blood supply to the brain, it can cause one to become lightheaded or faint. Sometimes the heart tries to compensate in raising the blood pressure by increasing the rate in the force of contraction. This can actually make the problem worse. As the heart beats faster and stronger it sends the wrong message to the brain that the ventricles are filled with blood when they are not. Other receptors send wrong messages that the blood pressure is too high when in fact the ventricle is not full and the blood pressure is actually low. This in turn causes the brain to slow the heart rate and lower the blood pressure. This causes the blood pressure to drop even lower and the risk of fainting increases. For most people, this process of returning the blood to the central part of the body works efficiently and without our notice, just like breathing. When this process does not work well, the following symptoms can be experienced: dizziness, lightheadedness, fainting, chest pain, heart racing, sweating, feeling too hot or too cold, rapid swings in body temperature, nausea, abdominal pain, GI problems, muscle aches or pains, fatigue, depression or inappropriate or exaggerated emotional responses to name a few. Because there are so many factors that effect blood return to the central organs, and infected person may be fine one minute and had significant trouble the next. Also, if someone has had this condition for a long time, they may not know what their body does is abnormal - sort of like not noticing when your eyesight decreases as you age. You Are Not Alone    Many people have been told they are\" crazy\" because a doctor cannot find anything physically wrong to explain the symptoms they are having. Many doctors do not know about this condition as it was not well understood until 1989, and at that time only the severe cases were identified - those people who passed out frequently. Now we note that you can have this condition even if he did not pass out. Some people get Neurocardiogenic Syncope abruptly in their teen years and it can leave just as abruptly. Some people have this forever but the symptoms wax and wane. For some it is hereditary, for others it is not. Don't be surprised if someone else in the family has some of the same sort of symptoms that you do. Neurocardiogenic Syncope can range from mild to incapacitating (bedridden). There is nothing that anyone did to cause this and nothing can be done to prevent getting this. It just is. Some ways to help identify this condition besides just symptoms include: Checking for changes in blood pressure and/or pulse between sitting and standing (stress of gravity) and a tilt table test. The tilt table test provides prolonged gravitational stress to the cardiovascular system (heart, blood vessels and brain) to reproduce the symptoms. Blood pressure, pulse and heart rhythm are monitored during this test.    Treatment    Treatment consists of understanding the condition, increasing salt and fluid intake and prescription medications. Knowing that you have this problem and what makes your symptoms better or worse is the first step. Physical illness, psychological stress, allergies (histamines cause blood vessels to dilate), hormonal changes (such as before or during the menstrual cycle), dehydration and barometric pressure changes ( including flying or being at high altitudes)  can make symptoms worse. Position is important because of gravity. You will have fewer symptoms when sitting or lying down than when standing. If still symptomatic when lying down, get your legs higher than your heart. This allows the blood to flow back to the heart. Moving your muscles helps to because this wheezes the blood vessels and helps return blood to the brain. (This is why many of us sway or squeeze our calves when standing). Good cardiovascular exercise is important also as it will decrease the symptoms. Regular aerobic exercise should be done for 40 minutes at least 3 times a week when it doesn't recreate symptoms. Take warm or cool showers instead of hot. When you are hot, the blood vessels in your arms and legs dilate to cool you down. Dilated blood vessels keep more blood away from your central organs and make you more symptomatic. People with Neurocardiogenic Syncope need a more fluid and salt every day more so than \" normal\" people. When feeling symptomatic, even more fluid and salt is needed. Fluid is important so there is enough volume in the blood vessels to make it to the brain. It is a lot like trying to fill a sink without having a stopper in the drain. It can be done, but it takes a lot of fluid. Salt helps keep fluid in the blood vessels longer. If you have a normal heart and kidneys, extra salt causes no problem. When you are symptomatic, sit down and drink a sports drink. They are loaded with sodium and other electrolytes. Eats something salty. In 15 minutes, you should feel better although your symptoms may not be completely gone. The sooner you treat your symptoms be easier it is to make them go away. The longer you would more than, the harder he is to get rid of them - it will take a lot more fluid and salt.     Some medications may be prescribed for your symptoms: · Mineralocorticoids - such as fludrocortisone, help the body retained salt and water. Side effects include: Elevated blood pressure, weight gain and edema. · Beta Blockers - such as metoprolol, block adrenaline that increases the heart rate and constricts the blood vessels. Side effects include: hypotension or low blood pressure and bradycardia or a low heart rate. · Selective Serotonin Reuptake Inhibitors (SSRI's) - such as fluoxetine or sertraline, help suppress the sympathetic nervous system, raise blood pressure and may treat accompanying chest pain. Side effects include: Dry mouth, nausea and insomnia. · Alpha Adrenergic Agonists - such as midodrine, constrict the blood vessels which decreases the pooling of blood. Side effects include: Hypertension or high blood pressure, dizziness and urinary retention. Compression stockings may be ordered to keep the blood from pooling in the feet and legs. Avoid standing in one position or place for a long time. If you don't have any choice, exercising your legs as you stand will help the blood flow back to the central organs. Diet changes can help relieve to low blood pressure. Refined carbohydrates such as white flour and sugar can lower blood pressure. Some foods may trigger symptoms. Avoid heavy meals and alcohol as these also may trigger symptoms. Neurocardiogenic Syncope can be controlled. The sooner you learn to recognizeyour symptoms and treat them appropriately, the quicker you will feel better. Medication may be added to control your symptoms. It does take  time to see the effects of medication, so be patient. Your medication may have to be changed and/or adjusted several times to get the proper control. Sometimes more than one medicine may be necessary. Even when you are under good control, you may have occasional bad days when you need to use a lot more salt and fluid than usual.  Take them in stride. This doesn't mean you are having a relapse.

## 2020-12-29 ENCOUNTER — OFFICE VISIT (OUTPATIENT)
Dept: PRIMARY CARE CLINIC | Age: 25
End: 2020-12-29
Payer: COMMERCIAL

## 2020-12-29 PROCEDURE — 99211 OFF/OP EST MAY X REQ PHY/QHP: CPT | Performed by: NURSE PRACTITIONER

## 2020-12-29 NOTE — PROGRESS NOTES
Yuki Tripathi received a viral test for COVID-19. They were educated on isolation and quarantine as appropriate. For any symptoms, they were directed to seek care from their PCP, given contact information to establish with a doctor, directed to an urgent care or the emergency room.

## 2020-12-29 NOTE — PATIENT INSTRUCTIONS

## 2020-12-30 LAB — SARS-COV-2, NAA: NOT DETECTED

## 2021-01-05 ENCOUNTER — OFFICE VISIT (OUTPATIENT)
Dept: PRIMARY CARE CLINIC | Age: 26
End: 2021-01-05

## 2021-01-05 DIAGNOSIS — Z20.828 EXPOSURE TO SARS-ASSOCIATED CORONAVIRUS: Primary | ICD-10-CM

## 2021-01-05 NOTE — PROGRESS NOTES
Cherri Ralph received a viral test for COVID-19. They were educated on isolation and quarantine as appropriate. For any symptoms, they were directed to seek care from their PCP, given contact information to establish with a doctor, directed to an urgent care or the emergency room.

## 2021-01-05 NOTE — PATIENT INSTRUCTIONS
Advance Care Planning  People with COVID-19 may have no symptoms, mild symptoms, such as fever, cough, and shortness of breath or they may have more severe illness, developing severe and fatal pneumonia. As a result, Advance Care Planning with attention to naming a health care decision maker (someone you trust to make healthcare decisions for you if you could not speak for yourself) and sharing other health care preferences is important BEFORE a possible health crisis. Please contact your Primary Care Provider to discuss Advance Care Planning. Preventing the Spread of Coronavirus Disease 2019 in Homes and Residential Communities  For the most recent information go to [email or web address]    Prevention steps for People with confirmed or suspected COVID-19 (including persons under investigation) who do not need to be hospitalized  and   People with confirmed COVID-19 who were hospitalized and determined to be medically stable to go home    Your healthcare provider and public health staff will evaluate whether you can be cared for at home. If it is determined that you do not need to be hospitalized and can be isolated at home, you will be monitored by staff from your local or state health department. You should follow the prevention steps below until a healthcare provider or local or state health department says you can return to your normal activities. Stay home except to get medical care  People who are mildly ill with COVID-19 are able to isolate at home during their illness. You should restrict activities outside your home, except for getting medical care. Do not go to work, school, or public areas. Avoid using public transportation, ride-sharing, or taxis.   Separate yourself from other people and animals in your home People: As much as possible, you should stay in a specific room and away from other people in your home. Also, you should use a separate bathroom, if available. Animals: You should restrict contact with pets and other animals while you are sick with COVID-19, just like you would around other people. Although there have not been reports of pets or other animals becoming sick with COVID-19, it is still recommended that people sick with COVID-19 limit contact with animals until more information is known about the virus. When possible, have another member of your household care for your animals while you are sick. If you are sick with COVID-19, avoid contact with your pet, including petting, snuggling, being kissed or licked, and sharing food. If you must care for your pet or be around animals while you are sick, wash your hands before and after you interact with pets and wear a facemask. Call ahead before visiting your doctor  If you have a medical appointment, call the healthcare provider and tell them that you have or may have COVID-19. This will help the healthcare providers office take steps to keep other people from getting infected or exposed. Wear a facemask  You should wear a facemask when you are around other people (e.g., sharing a room or vehicle) or pets and before you enter a healthcare providers office. If you are not able to wear a facemask (for example, because it causes trouble breathing), then people who live with you should not stay in the same room with you, or they should wear a facemask if they enter your room. Cover your coughs and sneezes  Cover your mouth and nose with a tissue when you cough or sneeze. Throw used tissues in a lined trash can. Immediately wash your hands with soap and water for at least 20 seconds or, if soap and water are not available, clean your hands with an alcohol-based hand  that contains at least 60% alcohol.   Clean your hands often Seek prompt medical attention if your illness is worsening (e.g., difficulty breathing). Before seeking care, call your healthcare provider and tell them that you have, or are being evaluated for, COVID-19. Put on a facemask before you enter the facility. These steps will help the healthcare providers office to keep other people in the office or waiting room from getting infected or exposed. Ask your healthcare provider to call the local or state health department. Persons who are placed under active monitoring or facilitated self-monitoring should follow instructions provided by their local health department or occupational health professionals, as appropriate. When working with your local health department check their available hours. If you have a medical emergency and need to call 911, notify the dispatch personnel that you have, or are being evaluated for COVID-19. If possible, put on a facemask before emergency medical services arrive. Discontinuing home isolation  Patients with confirmed COVID-19 should remain under home isolation precautions until the risk of secondary transmission to others is thought to be low. The decision to discontinue home isolation precautions should be made on a case-by-case basis, in consultation with healthcare providers and state and local health departments.

## 2021-01-06 LAB — SARS-COV-2, NAA: NOT DETECTED

## 2021-01-07 RX ORDER — NORGESTIMATE AND ETHINYL ESTRADIOL 7DAYSX3 28
KIT ORAL
Qty: 28 TABLET | Refills: 2 | Status: SHIPPED | OUTPATIENT
Start: 2021-01-07 | End: 2021-04-19 | Stop reason: SDUPTHER

## 2021-01-07 NOTE — TELEPHONE ENCOUNTER
Pt calling for refill on TRI-SPRINTEC 0.18/0.215/0.25 MG-35 MCG TABS         Last ov 6/25/20  Last labs 6/28/19

## 2021-01-25 RX ORDER — FLECAINIDE ACETATE 50 MG/1
50 TABLET ORAL 2 TIMES DAILY
Qty: 180 TABLET | Refills: 3 | Status: SHIPPED | OUTPATIENT
Start: 2021-01-25 | End: 2022-04-21 | Stop reason: SDUPTHER

## 2021-01-25 NOTE — TELEPHONE ENCOUNTER
Requested Prescriptions     Pending Prescriptions Disp Refills    flecainide (TAMBOCOR) 50 MG tablet 180 tablet 3     Sig: Take 1 tablet by mouth 2 times daily              Last Office Visit: 12/23/2020     Next Office Visit: 12/23/2021

## 2021-02-23 NOTE — PROGRESS NOTES
[address]   Cardiac Electrophysiology Consultation   Date: 2/25/2021  Reason for Consultation:  POTS  Consult Requesting Physician: No att. providers found     Chief Complaint:   Chief Complaint   Patient presents with    Palpitations      HPI: Marya Baltazar is a 26 y.o. referred by Dr. Ian Knapp for long h/o palpitations. PMH significant for AVNRT s/p ablation 2017 and syncope. Patient states that she will be tachycardic upon standing, resolves after sitting down or laying in bed the tachycardia will resolve. No other symptoms associated with palpitations and tachycardia. No particular triggering factors. Feels very different from her prior episodes of SVT for which she had an ablation in the past.  S/p positive tilt table study for POTS (7/30/19), started on metoprolol. 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 Due to complaints of worsening palpitations, she wore an event monitor (2/2020) that showed unifocal, symptomatic PVC's, but unable to determine burden. Verapamil was started and 14 day monitor (4/2020) showed SR with symptoms correlating with SR and occasional PVCs (<1%). Corlanor 5 mg BID was started while she was wearing the monitor for c/o HR up to 160's with minimal activity. Echo (6/2020) was normal.  Started on flecainide in 6/2020. Interval History: Today, she is here for f/u. She had been doing well for the past several months. However, within the past week, she's had 3 episodes of sustained palpitations that lasted for about 20 beats, mostly occurring in the early hours of the morning. One time it woke her up from her sleep. She is voicing interest in implanting an ILR as we have never been able to catch these episodes on a monitor. 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     Past Medical History:   Diagnosis Date    AVNRT [facility]AV eduar re-entry tachycardia) [facility] [address] 2016    EDS (Vianey-Danlos syndrome)     Inappropriate sinus tachycardia     Neutropenia ([address] 2016    PVC (premature ventricular contraction)     Scoliosis 2018    SI (sacroiliac) joint dysfunction 2018    Syncope         Past Surgical History:   Procedure Laterality Date    LYMPH NODE DISSECTION  2016    2017    OTHER SURGICAL HISTORY  2016    electrophysiology    TONSILLECTOMY      TONSILLECTOMY AND ADENOIDECTOMY  2002       Allergies: Allergies   Allergen Reactions    Doxycycline Nausea And Vomiting    Gold Other (See Comments), Hives and Rash     Other reaction(s): Skin Discoloration  urticaria         Medication:   Prior to Admission medications    Medication Sig Start Date End Date Taking? Authorizing Provider   flecainide (TAMBOCOR) 50 MG tablet Take 1 tablet by mouth 2 times daily 1/25/21  Yes RIDGE Rojo CNP   Norgestim-Eth Estrad Triphasic (TRI-SPRINTEC) 0.18/0.215/0.25 MG-35 MCG TABS TAKE 1 TABLET BY MOUTH DAILY 1/7/21  Yes Natan Holcomb DO   CORLANOR 5 MG TABS tablet TAKE 1 TABLET BY MOUTH TWICE DAILY WITH MEALS 10/27/20  Yes RIDGE Rojo CNP   verapamil (CALAN SR) 120 MG extended release tablet Take 1 tablet by mouth daily 10/7/20  Yes Carolann Sosa MD   cetirizine (ZYRTEC) 10 MG tablet Take 10 mg by mouth daily   Yes Historical Provider, MD   Elastic Bandages & Supports ([facility]) [facility] 1 each by Does not apply route daily 7/25/19  Yes Farzana Acosta MD       Social History:   reports that she has never smoked. She has never used smokeless tobacco. She reports current alcohol use. She reports that she does not use drugs. Family History:  family history includes Asthma in her brother and sister; Breast Cancer in her paternal grandmother; COPD in her maternal grandmother; Cancer in her maternal grandfather; Diabetes in her paternal grandfather; Heart Failure in her maternal grandmother; Prostate Cancer in her maternal grandfather. Reviewed.  Denies family history of sudden cardiac death, arrhythmia, premature CAD    Review of System:    · General ROS: negative for - chills, fever   · Psychological ROS: negative for - anxiety or depression  · Ophthalmic ROS: negative for - eye pain or loss of vision  · ENT ROS: negative for - epistaxis, headaches, nasal discharge, sore throat   · Allergy and Immunology ROS: negative for - hives, nasal congestion   · Hematological and Lymphatic ROS: negative for - bleeding problems, blood clots, bruising or jaundice  · Endocrine ROS: negative for - skin changes, temperature intolerance or unexpected weight changes  · Respiratory ROS: negative for - cough, hemoptysis, pleuritic pain, SOB, sputum changes or wheezing  · Cardiovascular ROS: Per HPI. · Gastrointestinal ROS: negative for - abdominal pain, blood in stools, diarrhea, hematemesis, melena, nausea/vomiting or swallowing difficulty/pain  · Genito-Urinary ROS: negative for - dysuria or incontinence  · Musculoskeletal ROS: negative for - joint swelling or muscle pain  · Neurological ROS: negative for - confusion, dizziness, gait disturbance, headaches, numbness/tingling, seizures, speech problems, tremors, visual changes or weakness  · Dermatological ROS: negative for - rash    Physical Examination:  Vitals:    02/25/21 1439   BP: 112/72   Pulse: 114   Temp: 98.2 °F (36.8 °C)       · Constitutional: Oriented. No distress. · Head: Normocephalic and atraumatic. · Mouth/Throat: Oropharynx is clear and moist.   · Eyes: Conjunctivae normal. EOM are normal.   · Neck: Normal range of motion. Neck supple. No rigidity. No JVD present. · Cardiovascular: Normal rate, regular rhythm, S1&S2 and intact distal pulses. · Pulmonary/Chest: Bilateral respiratory sounds. No wheezes. No rhonchi. · -Abdominal: Soft. Bowel sounds present. No distension, No tenderness. · Musculoskeletal: No tenderness. No edema    · Lymphadenopathy: Has no cervical adenopathy. · Neurological: Alert and oriented.  Cranial nerve appears intact, No consistent with PVCs. Monitor showed unifocal, symptomatic PVC's. Unable to determine burden as this was an event monitor. On Verapamil and Corlanor as PVC's are mostly outflow tract morphology. Her palpitations have improved since starting these medications. However, she does have occasional palpitations still, review of Apple watch have shown intranodal re-entery tachycardia. Normal echo. Discussed about the treatment options including lifestyle modifications  As the patient was highly symptomatic, was started on flecainide 50 mg BID  Continue verapamil 120 mg daily and Corlanor 5 mg BID    Recently, she had few episodes of palpitations, which slowed down now. Discussed about the options including increasing Flecainide to 100 mg bid transiently for a week. As she is feeling better, she like to wait for few weeks and see how she is doing. Follow up in 6 months with Jordana Esteban NP    Thank you for allowing me to participate in the care of Marya Baltazar. All questions and concerns were addressed to the patient/family. Alternatives to my treatment were discussed. Conner Go RN, am scribing for and in the presence of Dr. Ranjan Stevenson. 02/25/21 3:08 PM  Razia Cueva RN    I, Evette Flores MD, personally performed the services prescribed in this documentation as scribed by Ms. Razia Cueva RN in my presence and it is both accurate and complete.        Evette Flores MD  Cardiac Electrophysiology  [facility]

## 2021-02-25 ENCOUNTER — OFFICE VISIT (OUTPATIENT)
Dept: CARDIOLOGY CLINIC | Age: 26
End: 2021-02-25
Payer: COMMERCIAL

## 2021-02-25 VITALS
HEART RATE: 114 BPM | TEMPERATURE: 98.2 F | WEIGHT: 141.6 LBS | SYSTOLIC BLOOD PRESSURE: 112 MMHG | DIASTOLIC BLOOD PRESSURE: 72 MMHG | HEIGHT: 62 IN | BODY MASS INDEX: 26.06 KG/M2

## 2021-02-25 DIAGNOSIS — G90.A POTS (POSTURAL ORTHOSTATIC TACHYCARDIA SYNDROME): ICD-10-CM

## 2021-02-25 DIAGNOSIS — R00.0 INAPPROPRIATE SINUS TACHYCARDIA: ICD-10-CM

## 2021-02-25 DIAGNOSIS — R00.2 PALPITATIONS: ICD-10-CM

## 2021-02-25 DIAGNOSIS — I49.3 PVC (PREMATURE VENTRICULAR CONTRACTION): ICD-10-CM

## 2021-02-25 DIAGNOSIS — R55 SYNCOPE AND COLLAPSE: Primary | ICD-10-CM

## 2021-02-25 DIAGNOSIS — I47.1 AVNRT (AV NODAL RE-ENTRY TACHYCARDIA) (HCC): ICD-10-CM

## 2021-02-25 PROCEDURE — 99213 OFFICE O/P EST LOW 20 MIN: CPT | Performed by: INTERNAL MEDICINE

## 2021-02-25 PROCEDURE — 93000 ELECTROCARDIOGRAM COMPLETE: CPT | Performed by: INTERNAL MEDICINE

## 2021-02-25 NOTE — PATIENT INSTRUCTIONS
Implantable Loop Recorder    Date of Procedure:  March 5, 2021    Time of Arrival: 12:30 am    Cardiologist performing procedure:  Dr. Betsy Sánchez    · Arrive at [facility] through the main entrance. Check in at the Outpatient Diagnostic desk on the 1st floor. · No food or drink for 2 hours before your procedure. · You may brush your teeth and rinse the morning of the procedure. · Take ALL of your routine medications the morning of the procedure. · Do NOT stop taking aspirin, Plavix, coumadin, Eliquis, Xarelto, or Pradaxa (any blood thinners)    · Please use Hibiclens soap (or any antibacterial soap such as Dial) to wash your neck, chest, and abdomen the night before your procedure. · Do not apply any lotion, deodorant, or powder the morning of the procedure. · Please bring a list of your medications to the hospital with you. · You may drive yourself home. · If you are unable to make this appointment, please call [facility] at [phone].

## 2021-02-25 NOTE — LETTER
[address]  EP Procedure Sheet  2/25/21    Karl Garrett  1995    Physician: Dr. Ligia Meek    EP Procedures   Pacemaker implant  EP Study    ICD implant  Atrial flutter ablation     Biv implant  Atrial fibrillation ablation    Generator Change  SVT ablation    Lead revision  VT ablation    Lead extraction +/- upgrade  AVN ablation   x Loop implant  Cardioversion     Other:   CHERYL     Equipment  x Medtronic   TRIXIE Mapping System    [place]  [address] [address]  CryoAblation    Biotronik  Laser Lead Extraction    Special Equipment       EP Procedures Scheduling Request  Time Requested  1400   Specific Day 3/5/21   Anesthesia    CT surgery backup    Location [facility]     Pre-Procedure Labs / Imaging   PT/INR  Type & cross    CBC  Units PRBC    BMP/Mg  Units FFP    Venogram  CXR    Echo  Pulmonary CTA for Pulmonary vein mapping     Patient Instructions

## 2021-02-26 ENCOUNTER — TELEPHONE (OUTPATIENT)
Dept: CARDIOLOGY CLINIC | Age: 26
End: 2021-02-26

## 2021-02-26 NOTE — TELEPHONE ENCOUNTER
Spoke with pt and confirmed Implantable Loop Recorder on 03/03/2021, pt arriving at [facility] at 12:30am.  Reviewed all preop instructions previously given to pt. Pt verbalized understanding.

## 2021-03-01 ENCOUNTER — PREP FOR PROCEDURE (OUTPATIENT)
Dept: CARDIOLOGY CLINIC | Age: 26
End: 2021-03-01

## 2021-03-01 ENCOUNTER — TELEPHONE (OUTPATIENT)
Dept: CARDIOLOGY CLINIC | Age: 26
End: 2021-03-01

## 2021-03-01 RX ORDER — SODIUM CHLORIDE 0.9 % (FLUSH) 0.9 %
10 SYRINGE (ML) INJECTION EVERY 12 HOURS SCHEDULED
Status: CANCELLED | OUTPATIENT
Start: 2021-03-01

## 2021-03-01 RX ORDER — SODIUM CHLORIDE 0.9 % (FLUSH) 0.9 %
10 SYRINGE (ML) INJECTION PRN
Status: CANCELLED | OUTPATIENT
Start: 2021-03-01

## 2021-03-01 RX ORDER — SODIUM CHLORIDE 9 MG/ML
INJECTION, SOLUTION INTRAVENOUS CONTINUOUS
Status: CANCELLED | OUTPATIENT
Start: 2021-03-01

## 2021-03-01 NOTE — TELEPHONE ENCOUNTER
Spoke with patient and rescheduled Loop implant for 3/8/21 at 2:30. Went over instructions with patient. Verbalized understanding.

## 2021-03-08 ENCOUNTER — HOSPITAL ENCOUNTER (OUTPATIENT)
Dept: CARDIAC CATH/INVASIVE PROCEDURES | Age: 26
Setting detail: OUTPATIENT SURGERY
Discharge: HOME | End: 2021-03-08
Attending: INTERNAL MEDICINE | Admitting: INTERNAL MEDICINE
Payer: COMMERCIAL

## 2021-03-08 VITALS — TEMPERATURE: 97.9 F | HEIGHT: 62 IN | BODY MASS INDEX: 25.76 KG/M2 | WEIGHT: 140 LBS

## 2021-03-08 DIAGNOSIS — Z45.09 ENCOUNTER FOR LOOP RECORDER CHECK: Primary | ICD-10-CM

## 2021-03-08 LAB — HCG(URINE) PREGNANCY TEST: NEGATIVE

## 2021-03-08 PROCEDURE — C1764 EVENT RECORDER, CARDIAC: HCPCS

## 2021-03-08 PROCEDURE — 33285 INSJ SUBQ CAR RHYTHM MNTR: CPT

## 2021-03-08 PROCEDURE — 33285 INSJ SUBQ CAR RHYTHM MNTR: CPT | Performed by: INTERNAL MEDICINE

## 2021-03-08 PROCEDURE — 84703 CHORIONIC GONADOTROPIN ASSAY: CPT

## 2021-03-08 RX ORDER — SODIUM CHLORIDE 0.9 % (FLUSH) 0.9 %
10 SYRINGE (ML) INJECTION PRN
Status: DISCONTINUED | OUTPATIENT
Start: 2021-03-08 | End: 2021-03-09 | Stop reason: HOSPADM

## 2021-03-08 RX ORDER — SODIUM CHLORIDE 9 MG/ML
INJECTION, SOLUTION INTRAVENOUS CONTINUOUS
Status: DISCONTINUED | OUTPATIENT
Start: 2021-03-08 | End: 2021-03-09 | Stop reason: HOSPADM

## 2021-03-08 RX ORDER — SODIUM CHLORIDE 0.9 % (FLUSH) 0.9 %
10 SYRINGE (ML) INJECTION EVERY 12 HOURS SCHEDULED
Status: DISCONTINUED | OUTPATIENT
Start: 2021-03-08 | End: 2021-03-09 | Stop reason: HOSPADM

## 2021-03-09 NOTE — PROCEDURES
[facility]     Electrophysiology Procedure Note       Date of Procedure: 3/8/2021  Patient's Name: Severa Fallen  YOB: 1995   Medical Record Number: [id number]  Procedure Performed by: Negar Montiel MD.,    Procedures performed:    · Loop recorder implantation    Indication of the procedure: Syncope, Palpitation   Severa Fallen is a 26 y.o. female with palpitations, s/p AVNRT ablation and syncope. Therefore, the patient has been scheduled to undergo an ILR implantation. Details of procedure:   Procedure's risks, benefits and alternatives of procedure were explained to patient. All questions were answered. Patient understood and informed consent was obtained. The patient was brought to the holding bay in a fasting nonsedated state. Patient was prepped and draped in usual sterile fashion. No moderate sedation (conscious sedation) nor general anesthesia was administered or utilized for this procedure. The patient was monitored continuously with ECG, pulse oximetry, blood pressure monitoring, and direct observation. After injection of 0.5% bupivacaine/lidocaine mixture in the left 4th intercostal space, a 5 mm small incision was made using the Medtronic-provided scalpel, after which a #11 blade was used to expand the opening of this incision on both ends. Then a Medtronic-provided tunneling tool was inserted into this scar in a diagonal trajectory towards the L nipple which created the necessary space to insert the implantable loop recorder. This tool was used to deliver the implantable loop recorder into the created space. Both the plunger and the tunneling tool was then retracted. The surgical scar was taped with Steri-strips and manual pressure was held over the taped area to achieve hemostasis. The patient tolerated the procedure well without any complications.     Device information:   The device is a CRAVE Reveal LINQ [id number] implant date: 3/8/2021  The device was programmed to detect:   VT: 380 ms 16 beats   Bradycardia: 2000 ms   Pause : 3 seconds    Impression:    Pre- and post-procedural diagnoses of SVT, s/p AVNRT ablation with recurrent palpitations and syncope with successful implantation of a Medtronic Implantable Loop Recorder. Plan:   The patient will have usual post-implant care with a 1-week wound check with our nurse in the [facility] at [facility]). The patient can be discharged home if the patient remains stable. Follow-up also includes routine device interrogation with the Device Clinic. Thank you for allowing us to participate in the care of your patient. If you have any questions or comments, please feel free to contact us.     Carl Cote MD   Cardiac Electrophysiology  [facility]

## 2021-03-10 ENCOUNTER — TELEPHONE (OUTPATIENT)
Dept: FAMILY MEDICINE CLINIC | Age: 26
End: 2021-03-10

## 2021-03-10 NOTE — TELEPHONE ENCOUNTER
[address] Trans[facility] Initial Follow Up Call    Outreach made within 2 business days of discharge: Yes    Patient: Toyin Stark Patient : 1995   MRN: <[id number]>  Reason for Admission: There are no discharge diagnoses documented for the most recent discharge. Discharge Date: 3/8/21       Spoke with: Left voicemail for patient to call office with questions, concerns and to schedule HFU.     Discharge department/facility: [facility]    Scheduled appointment with PCP within 7-14 days    Follow Up  Future Appointments   Date Time Provider Tomasz Eckert   3/12/2021 10:00 AM SCHEDULE, Mahesh Asher [address] [facility]   2021  2:00 PM SCHEDULE, Mahesh Manzano Card [facility]   2021  2:30 PM RIDGE Arnett CNP Card [facility]   2021  7:30 AM SCHEDULE, Kelsey Chu REMOTE TRANSMISSION Mary Card [facility]   2021  1:00 PM MD Mary Davies Card [facility]       Tanner Lang, [address] [place] [address] Elena Lucas

## 2021-03-12 ENCOUNTER — NURSE ONLY (OUTPATIENT)
Dept: CARDIOLOGY CLINIC | Age: 26
End: 2021-03-12
Payer: COMMERCIAL

## 2021-03-12 DIAGNOSIS — R55 SYNCOPE, UNSPECIFIED SYNCOPE TYPE: ICD-10-CM

## 2021-03-12 DIAGNOSIS — R00.2 PALPITATIONS: ICD-10-CM

## 2021-03-12 DIAGNOSIS — G90.A POTS (POSTURAL ORTHOSTATIC TACHYCARDIA SYNDROME): ICD-10-CM

## 2021-03-12 DIAGNOSIS — R00.0 TACHYCARDIA: ICD-10-CM

## 2021-03-12 DIAGNOSIS — Z45.09 ENCOUNTER FOR LOOP RECORDER CHECK: ICD-10-CM

## 2021-03-12 PROCEDURE — 93291 INTERROG DEV EVAL SCRMS IP: CPT | Performed by: INTERNAL MEDICINE

## 2021-03-12 NOTE — PROGRESS NOTES
1 week wound and device check  Original ILR scheduled for 3/5/12. R/S to 3/8/21. Hx: Palps, Tach, s/p AVNRT ablation, syncope. Temp 98.2    Outer dressing removed prior to today's visit with only steri strips remaining. Old drainage noted on steri strips. Carefully removed steri strips. No hematoma or swelling noted. Area cleaned w/alcohol swabs. Advised patient she may get incision wet starting today. Incision is clean and dry. Placed one band aid over incision due to under garment. Advised can leave site open to the air once returns home. Reviewed home monitor and symptom recordings. Normal device function. NSR @ 70-75 bpm  No new arrhythmias/symptoms/episodes. Updated ID in . Parameter settings at implant for palpitations. Follow up as scheduled.

## 2021-04-10 DIAGNOSIS — R00.2 PALPITATIONS: ICD-10-CM

## 2021-04-12 NOTE — TELEPHONE ENCOUNTER
Requested Prescriptions     Pending Prescriptions Disp Refills    verapamil (CALAN SR) 120 MG extended release tablet [Pharmacy Med Name: VERAPAMIL ER 120MG TABLETS] 90 tablet 3     Sig: TAKE 1 TABLET BY MOUTH DAILY                Last Office Visit: 2/25/2021     Next Office Visit: 4/14/2021

## 2021-04-14 ENCOUNTER — NURSE ONLY (OUTPATIENT)
Dept: CARDIOLOGY CLINIC | Age: 26
End: 2021-04-14
Payer: COMMERCIAL

## 2021-04-14 DIAGNOSIS — Z45.09 ENCOUNTER FOR LOOP RECORDER CHECK: ICD-10-CM

## 2021-04-14 DIAGNOSIS — R00.0 TACHYCARDIA: ICD-10-CM

## 2021-04-14 DIAGNOSIS — R00.2 PALPITATIONS: ICD-10-CM

## 2021-04-14 DIAGNOSIS — R55 SYNCOPE, UNSPECIFIED SYNCOPE TYPE: ICD-10-CM

## 2021-04-14 DIAGNOSIS — G90.A POTS (POSTURAL ORTHOSTATIC TACHYCARDIA SYNDROME): ICD-10-CM

## 2021-04-14 PROCEDURE — G2066 INTER DEVC REMOTE 30D: HCPCS | Performed by: INTERNAL MEDICINE

## 2021-04-14 PROCEDURE — 93298 REM INTERROG DEV EVAL SCRMS: CPT | Performed by: INTERNAL MEDICINE

## 2021-04-14 NOTE — PROGRESS NOTES
Remote interrogation of implanted cardiac event monitor shows normal function. ILR for palpitations. Hx AVNRT s/p ablation 2017 and syncope. No arrhythmias/events recorded. Follow up 1 month via carelink.

## 2021-04-19 NOTE — TELEPHONE ENCOUNTER
Requested Prescriptions     Pending Prescriptions Disp Refills    Norgestim-Eth Estrad Triphasic (TRI-SPRINTEC) 0.18/0.215/0.25 MG-35 MCG TABS 28 tablet 2     Sig: TAKE 1 TABLET BY MOUTH DAILY     Last ov  06/25/2020  Last lab 06/28/2019    [facility] CMA

## 2021-04-20 RX ORDER — NORGESTIMATE AND ETHINYL ESTRADIOL 7DAYSX3 28
KIT ORAL
Qty: 28 TABLET | Refills: 2 | Status: SHIPPED | OUTPATIENT
Start: 2021-04-20 | End: 2021-07-19

## 2021-04-23 ENCOUNTER — TELEPHONE (OUTPATIENT)
Dept: CARDIOLOGY CLINIC | Age: 26
End: 2021-04-23

## 2021-05-18 NOTE — PROGRESS NOTES
ILR for palpitations. Hx AVNRT s/p ablation 2017 and syncope. Remote interrogation of implanted cardiac event monitor shows normal function. No  arrhythmias recorded. Follow up 1 month via carelink.

## 2021-05-19 ENCOUNTER — NURSE ONLY (OUTPATIENT)
Dept: CARDIOLOGY CLINIC | Age: 26
End: 2021-05-19
Payer: COMMERCIAL

## 2021-05-19 DIAGNOSIS — R00.0 TACHYCARDIA: ICD-10-CM

## 2021-05-19 DIAGNOSIS — R55 SYNCOPE, UNSPECIFIED SYNCOPE TYPE: ICD-10-CM

## 2021-05-19 DIAGNOSIS — Z45.09 ENCOUNTER FOR LOOP RECORDER CHECK: ICD-10-CM

## 2021-05-19 DIAGNOSIS — R00.2 PALPITATIONS: ICD-10-CM

## 2021-05-19 DIAGNOSIS — G90.A POTS (POSTURAL ORTHOSTATIC TACHYCARDIA SYNDROME): ICD-10-CM

## 2021-05-19 PROCEDURE — 93298 REM INTERROG DEV EVAL SCRMS: CPT | Performed by: INTERNAL MEDICINE

## 2021-05-19 PROCEDURE — G2066 INTER DEVC REMOTE 30D: HCPCS | Performed by: INTERNAL MEDICINE

## 2021-06-23 ENCOUNTER — NURSE ONLY (OUTPATIENT)
Dept: CARDIOLOGY CLINIC | Age: 26
End: 2021-06-23
Payer: COMMERCIAL

## 2021-06-23 DIAGNOSIS — G90.A POTS (POSTURAL ORTHOSTATIC TACHYCARDIA SYNDROME): ICD-10-CM

## 2021-06-23 DIAGNOSIS — Z45.09 ENCOUNTER FOR LOOP RECORDER CHECK: ICD-10-CM

## 2021-06-23 DIAGNOSIS — R55 SYNCOPE, UNSPECIFIED SYNCOPE TYPE: ICD-10-CM

## 2021-06-23 DIAGNOSIS — R00.2 PALPITATIONS: ICD-10-CM

## 2021-06-23 NOTE — PROGRESS NOTES
Remote transmission received for patients ILR. EP physician will review. See interrogation under the cardiology tab in the [facility] [address] field for more details. Will continue to monitor remotely. ILR for palpitations. 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 No new arrhythmias.

## 2021-06-27 PROCEDURE — G2066 INTER DEVC REMOTE 30D: HCPCS | Performed by: INTERNAL MEDICINE

## 2021-06-27 PROCEDURE — 93298 REM INTERROG DEV EVAL SCRMS: CPT | Performed by: INTERNAL MEDICINE

## 2021-07-19 RX ORDER — NORGESTIMATE AND ETHINYL ESTRADIOL 7DAYSX3 28
KIT ORAL
Qty: 28 TABLET | Refills: 2 | Status: SHIPPED | OUTPATIENT
Start: 2021-07-19 | End: 2021-10-14

## 2021-07-28 ENCOUNTER — NURSE ONLY (OUTPATIENT)
Dept: CARDIOLOGY CLINIC | Age: 26
End: 2021-07-28
Payer: COMMERCIAL

## 2021-07-28 DIAGNOSIS — R00.2 PALPITATIONS: ICD-10-CM

## 2021-07-28 DIAGNOSIS — Z45.09 ENCOUNTER FOR LOOP RECORDER CHECK: ICD-10-CM

## 2021-07-28 PROCEDURE — 93298 REM INTERROG DEV EVAL SCRMS: CPT | Performed by: INTERNAL MEDICINE

## 2021-07-28 PROCEDURE — G2066 INTER DEVC REMOTE 30D: HCPCS | Performed by: INTERNAL MEDICINE

## 2021-07-28 NOTE — PROGRESS NOTES
ILR for palpitations. 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 Remote transmission received for patients ILR. No new arrhythmias/events recorded. EP physician will review. See interrogation under the cardiology tab in the [address] [place] [address] [address] field for more details. Will continue to monitor remotely.

## 2021-08-17 ENCOUNTER — TELEPHONE (OUTPATIENT)
Dept: CARDIOLOGY CLINIC | Age: 26
End: 2021-08-17

## 2021-08-17 NOTE — TELEPHONE ENCOUNTER
[place]#[id number]     insurance contact info -[phone]   Pharmacist called to inform rajinder rojas office patient has new insurance and needs a prior auth for medication Corlanor 5mg

## 2021-08-18 RX ORDER — IVABRADINE 5 MG/1
TABLET, FILM COATED ORAL
Qty: 180 TABLET | Refills: 3 | Status: SHIPPED | OUTPATIENT
Start: 2021-08-18 | End: 2022-01-12 | Stop reason: SDUPTHER

## 2021-08-18 NOTE — TELEPHONE ENCOUNTER
Requested Prescriptions     Pending Prescriptions Disp Refills    ivabradine (CORLANOR) 5 MG TABS tablet 180 tablet 3     Sig: TAKE 1 TABLET BY MOUTH TWICE DAILY WITH MEALS          Number: 180    Refills: 3    Last Office Visit: 2/25/2021     Next Office Visit: 9/1/2021     Last Labs: [phone]

## 2021-08-19 NOTE — TELEPHONE ENCOUNTER
The pharmacy claim for Corlanor 5MG tablets has been rejected by insurance. Non-preferred medications may have a higher patient co-pay than the health insurance plan's preferred medications. Using available formulary data, we believe that the following medications may be covered. Drug Name  PA Requirement  Propranolol HCl ER Tier 1 NOT Required*  Metoprolol Tartrate Tier 1,  NOT Required*  Metoprolol Succinate ER Tier 1 NOT Required*  Bisoprolol Fumarate Tier 1 NOT Required*  Carvedilol Tier 1, QL NOT Required*  Labetalol HCl Tier 1 NOT Required*  Propranolol HCl Tier 1 NOT Required*  Atenolol Tier 1 NOT Required*  Bystolic Tier 3, QL NOT Required*  Nadolol Tier 2 NOT Required*       Do you want to change to any of these or attempt PA for Corlanor 5mg?

## 2021-09-01 ENCOUNTER — NURSE ONLY (OUTPATIENT)
Dept: CARDIOLOGY CLINIC | Age: 26
End: 2021-09-01
Payer: COMMERCIAL

## 2021-09-01 DIAGNOSIS — Z45.09 ENCOUNTER FOR LOOP RECORDER CHECK: ICD-10-CM

## 2021-09-01 DIAGNOSIS — R00.2 PALPITATIONS: ICD-10-CM

## 2021-09-01 PROCEDURE — G2066 INTER DEVC REMOTE 30D: HCPCS | Performed by: INTERNAL MEDICINE

## 2021-09-01 PROCEDURE — 93298 REM INTERROG DEV EVAL SCRMS: CPT | Performed by: INTERNAL MEDICINE

## 2021-10-06 ENCOUNTER — NURSE ONLY (OUTPATIENT)
Dept: CARDIOLOGY CLINIC | Age: 26
End: 2021-10-06
Payer: COMMERCIAL

## 2021-10-06 DIAGNOSIS — R00.2 PALPITATIONS: ICD-10-CM

## 2021-10-06 DIAGNOSIS — Z45.09 ENCOUNTER FOR LOOP RECORDER CHECK: ICD-10-CM

## 2021-10-06 PROCEDURE — G2066 INTER DEVC REMOTE 30D: HCPCS | Performed by: INTERNAL MEDICINE

## 2021-10-06 PROCEDURE — 93298 REM INTERROG DEV EVAL SCRMS: CPT | Performed by: INTERNAL MEDICINE

## 2021-10-14 RX ORDER — NORGESTIMATE AND ETHINYL ESTRADIOL 7DAYSX3 28
KIT ORAL
Qty: 28 TABLET | Refills: 2 | Status: SHIPPED | OUTPATIENT
Start: 2021-10-14 | End: 2022-01-05

## 2021-10-14 NOTE — TELEPHONE ENCOUNTER
Requested Prescriptions     Pending Prescriptions Disp Refills    Norgestim-Eth Estrad Triphasic (TRI-SPRINTEC) 0.18/0.215/0.25 MG-35 MCG TABS [Pharmacy Med Name: TRI-SPRINTEC TABLETS 28S] 28 tablet 2     Sig: TAKE 1 TABLET BY MOUTH DAILY       Last ov 6/25/2020  Last labs 6/28/2019

## 2021-11-10 ENCOUNTER — NURSE ONLY (OUTPATIENT)
Dept: CARDIOLOGY CLINIC | Age: 26
End: 2021-11-10
Payer: COMMERCIAL

## 2021-11-10 DIAGNOSIS — Z45.09 ENCOUNTER FOR LOOP RECORDER CHECK: ICD-10-CM

## 2021-11-10 DIAGNOSIS — R00.2 PALPITATIONS: ICD-10-CM

## 2021-11-10 PROCEDURE — G2066 INTER DEVC REMOTE 30D: HCPCS | Performed by: INTERNAL MEDICINE

## 2021-11-10 PROCEDURE — 93298 REM INTERROG DEV EVAL SCRMS: CPT | Performed by: INTERNAL MEDICINE

## 2021-11-10 NOTE — PROGRESS NOTES
ILR for palpitations. 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 Remote transmission received for patients ILR. No new arrhythmias/events recorded. EP physician will review. See interrogation under the cardiology tab in the [address] [place] [address] [address] field for more details. Will continue to monitor remotely.

## 2021-11-12 NOTE — H&P
Patient's History and Physical from February 23, by me was reviewed. Patient examined. There has been no change in the plan. Surgical site verified.        Ruba Swain MD   Cardiac Electrophysiology  [address] [place] [address] [phone]  [facility]
30

## 2021-12-15 ENCOUNTER — NURSE ONLY (OUTPATIENT)
Dept: CARDIOLOGY CLINIC | Age: 26
End: 2021-12-15
Payer: COMMERCIAL

## 2021-12-15 DIAGNOSIS — Z45.09 ENCOUNTER FOR LOOP RECORDER CHECK: ICD-10-CM

## 2021-12-15 DIAGNOSIS — R00.2 PALPITATIONS: ICD-10-CM

## 2021-12-15 NOTE — PROGRESS NOTES
[facility]   Cardiac Electrophysiology Consultation   Date: 12/23/2021  Reason for Consultation:  POTS  Consult Requesting Physician: No att. providers found     Chief Complaint:   Chief Complaint   Patient presents with    1 Year Follow Up      HPI: Anayeli Vazquez is a 23 y.o. referred by Dr. Chey Love for long h/o palpitations. PMH significant for AVNRT s/p ablation 2017 and syncope. Patient states that she will be tachycardic upon standing, resolves after sitting down or laying in bed the tachycardia will resolve. No other symptoms associated with palpitations and tachycardia. No particular triggering factors. Feels very different from her prior episodes of SVT for which she had an ablation in the past.  S/p positive tilt table study for POTS (7/30/19), started on metoprolol. 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 Due to complaints of worsening palpitations, she wore an event monitor (2/2020) that showed unifocal, symptomatic PVC's, but unable to determine burden. Verapamil was started and 14 day monitor (4/2020) showed SR with symptoms correlating with SR and occasional PVCs (<1%). Corlanor 5 mg BID was started while she was wearing the monitor for c/o HR up to 160's with minimal activity. Echo (6/2020) was normal.  Started on flecainide in 6/2020. S/p ILR 3/8/21. Interval History: Today, she is here for a 1 yr f/u. She has been doing well for the past year. Denies side effects to her medications. She reports occasional palpitations, but they are very brief and not bothersome. Review of her ILR shows no arrhythmias. She is now on her own insurance and the Corlanor is going to be $500 per month. She graduated from [employer] in May and is currently working at the [employer], occasionally at [employer].     Past Medical History:   Diagnosis Date    AVNRT (AV eduar re-entry tachycardia) ([facility] [address] 2016    EDS (Vianey-Danlos syndrome)     Inappropriate sinus tachycardia     Neutropenia ([address] 2016    PVC (premature ventricular contraction)     Scoliosis 2018    SI (sacroiliac) joint dysfunction 2018    Syncope         Past Surgical History:   Procedure Laterality Date    LYMPH NODE DISSECTION  2016    2017    OTHER SURGICAL HISTORY  2016    electrophysiology    TONSILLECTOMY      TONSILLECTOMY AND ADENOIDECTOMY  2002       Allergies: Allergies   Allergen Reactions    Doxycycline Nausea And Vomiting    Gold Other (See Comments), Hives and Rash     Other reaction(s): Skin Discoloration  urticaria         Medication:   Prior to Admission medications    Medication Sig Start Date End Date Taking? Authorizing Provider   Norgestim-Eth Estrad Triphasic (TRI-SPRINTEC) 0.18/0.215/0.25 MG-35 MCG TABS TAKE 1 TABLET BY MOUTH DAILY 10/14/21  Yes Natan Holcomb,    ivabradine (CORLANOR) 5 MG TABS tablet TAKE 1 TABLET BY MOUTH TWICE DAILY WITH MEALS 8/18/21  Yes Lorterri Dozierza, APRN - CNP   verapamil (CALAN SR) 120 MG extended release tablet TAKE 1 TABLET BY MOUTH DAILY 4/12/21  Yes Mayi Chacon APRN - CNP   flecainide (TAMBOCOR) 50 MG tablet Take 1 tablet by mouth 2 times daily 1/25/21  Yes Lorretta Mark, APRN - CNP   cetirizine (ZYRTEC) 10 MG tablet Take 10 mg by mouth daily   Yes Historical Provider, MD   Elastic Bandages & Supports ([facility]) [facility] 1 each by Does not apply route daily 7/25/19  Yes Lala Fabry, MD       Social History:   reports that she has never smoked. She has never used smokeless tobacco. She reports current alcohol use. She reports that she does not use drugs. Family History:  family history includes Asthma in her brother and sister; Breast Cancer in her paternal grandmother; COPD in her maternal grandmother; Cancer in her maternal grandfather; Diabetes in her paternal grandfather; Heart Failure in her maternal grandmother; Prostate Cancer in her maternal grandfather. Reviewed.  Denies family history of sudden cardiac death, arrhythmia, premature CAD    Review of System:    · General ROS: negative for - chills, fever   · Psychological ROS: negative for - anxiety or depression  · Ophthalmic ROS: negative for - eye pain or loss of vision  · ENT ROS: negative for - epistaxis, headaches, nasal discharge, sore throat   · Allergy and Immunology ROS: negative for - hives, nasal congestion   · Hematological and Lymphatic ROS: negative for - bleeding problems, blood clots, bruising or jaundice  · Endocrine ROS: negative for - skin changes, temperature intolerance or unexpected weight changes  · Respiratory ROS: negative for - cough, hemoptysis, pleuritic pain, SOB, sputum changes or wheezing  · Cardiovascular ROS: Per HPI. · Gastrointestinal ROS: negative for - abdominal pain, blood in stools, diarrhea, hematemesis, melena, nausea/vomiting or swallowing difficulty/pain  · Genito-Urinary ROS: negative for - dysuria or incontinence  · Musculoskeletal ROS: negative for - joint swelling or muscle pain  · Neurological ROS: negative for - confusion, dizziness, gait disturbance, headaches, numbness/tingling, seizures, speech problems, tremors, visual changes or weakness  · Dermatological ROS: negative for - rash    Physical Examination:  Vitals:    12/23/21 1308   BP: 104/60   Pulse: 74       · Constitutional: Oriented. No distress. · Head: Normocephalic and atraumatic. · Mouth/Throat: Oropharynx is clear and moist.   · Eyes: Conjunctivae normal. EOM are normal.   · Neck: Normal range of motion. Neck supple. No rigidity. No JVD present. · Cardiovascular: Normal rate, regular rhythm, S1&S2 and intact distal pulses. · Pulmonary/Chest: Bilateral respiratory sounds. No wheezes. No rhonchi. · -Abdominal: Soft. Bowel sounds present. No distension, No tenderness. · Musculoskeletal: No tenderness. No edema    · Lymphadenopathy: Has no cervical adenopathy. · Neurological: Alert and oriented.  Cranial nerve appears intact, No Gross deficit   · Skin: Skin is warm and dry. No rash noted. · Psychiatric: Has a normal mood, affect and behavior     Labs:  Reviewed. ECG: reviewed, SR 85, with QRS duration 84 ms. No pathologic Q waves, ventricular pre-excitation, or QT prolongation. Studies:   1. Holter (7/16/19):  SR/SA with average HR 81 (), rare PAC's, rare PVC's      30 day Event monitor (2/19-3/20/20):  SR with unifocal, symptomatic PVC's, blocked PACs, and nonsustained SVT      14 day CAM (4/14-4/28/20):  SR with average HR 72 (). Symptoms correlating with SR and occasional PVC (<1%)    2. Echo 6/16/20  Summary   Left ventricular cavity size is normal. Normal left ventricular wall   thickness. Overall left ventricular systolic function appears normal.   Ejection fraction is visually estimated to be 55-60%. No regional wall   motion abnormalities are noted. Normal diastolic function. Trivial mitral and pulmonic regurgitation. Mild tricuspid regurgitation with an RVSP of 34mmHg. Echo 7/9/19:    Summary   Left ventricular cavity size is normal. Normal left ventricular wall   thickness. Overall left ventricular systolic function appears low normal   with an ejection fraction of 50-55%. No regional wall motion abnormalities   are noted. Normal diastolic function. Trivial tricuspid regurgitation. Estimated pulmonary artery systolic pressure is at 25 mmHg assuming a right   atri/al pressure of 3 mmHg. 3. Stress Test:  none    4. Cath:  none    The MCOT, echocardiogram, stress test, and coronary angiography/PCI were reviewed by myself and used for my plan of care. Procedures:  1.  none    Assessment/Plan:  1. Syncope  2. Palpitations  3. POTS  4.  AVNRT s/p ablation 2017  5. PVC's  6. IST  7. S/p ILR 3/2021    She had a slow pathway modification in 2017. In addition to this, she was also noted to have very sensitive sinus node during the procedure. Currently, her symptoms are a bit different and more consistent with PVCs.  Monitor showed unifocal, symptomatic PVC's. Unable to determine burden as this was an event monitor. On Verapamil and Corlanor as PVC's are mostly outflow tract morphology. Her palpitations have improved since starting these medications. However, she does have occasional palpitations still, review of Apple watch have shown intranodal re-entery tachycardia. Normal echo. Discussed about the treatment options including lifestyle modifications. As the patient was highly symptomatic, was started on flecainide 50 mg BID  She has been doing well with occasional palpitations that are not bothersome to her. Continue verapamil 120 mg daily and Corlanor 5 mg BID    Continue with monthly monitoring of ILR  Follow up in 1 year with EP NP    Thank you for allowing me to participate in the care of Lazaro Harris. All questions and concerns were addressed to the patient/family. Alternatives to my treatment were discussed. Ángela Garcia RN, am scribing for and in the presence of Dr. Angel Luis Mancilla. 12/23/21 1:39 PM  Palmira Chairez RN    I, Aby Gonzalez MD, personally performed the services prescribed in this documentation as scribed by Ms. Palmira Chairez RN in my presence and it is both accurate and complete.        Aby Gonzalez MD  Cardiac Electrophysiology  [facility]

## 2021-12-18 PROCEDURE — G2066 INTER DEVC REMOTE 30D: HCPCS | Performed by: INTERNAL MEDICINE

## 2021-12-18 PROCEDURE — 93298 REM INTERROG DEV EVAL SCRMS: CPT | Performed by: INTERNAL MEDICINE

## 2021-12-23 ENCOUNTER — OFFICE VISIT (OUTPATIENT)
Dept: CARDIOLOGY CLINIC | Age: 26
End: 2021-12-23
Payer: COMMERCIAL

## 2021-12-23 ENCOUNTER — NURSE ONLY (OUTPATIENT)
Dept: CARDIOLOGY CLINIC | Age: 26
End: 2021-12-23

## 2021-12-23 VITALS
HEART RATE: 74 BPM | BODY MASS INDEX: 27.53 KG/M2 | DIASTOLIC BLOOD PRESSURE: 60 MMHG | HEIGHT: 61 IN | SYSTOLIC BLOOD PRESSURE: 104 MMHG | WEIGHT: 145.8 LBS

## 2021-12-23 DIAGNOSIS — R00.2 PALPITATIONS: ICD-10-CM

## 2021-12-23 DIAGNOSIS — Z45.09 ENCOUNTER FOR LOOP RECORDER CHECK: ICD-10-CM

## 2021-12-23 DIAGNOSIS — R55 SYNCOPE AND COLLAPSE: Primary | ICD-10-CM

## 2021-12-23 DIAGNOSIS — I47.1 AVNRT (AV NODAL RE-ENTRY TACHYCARDIA) (HCC): ICD-10-CM

## 2021-12-23 DIAGNOSIS — I49.3 PVC (PREMATURE VENTRICULAR CONTRACTION): ICD-10-CM

## 2021-12-23 DIAGNOSIS — G90.A POTS (POSTURAL ORTHOSTATIC TACHYCARDIA SYNDROME): ICD-10-CM

## 2021-12-23 DIAGNOSIS — R00.0 INAPPROPRIATE SINUS TACHYCARDIA: ICD-10-CM

## 2021-12-23 PROCEDURE — 99214 OFFICE O/P EST MOD 30 MIN: CPT | Performed by: INTERNAL MEDICINE

## 2021-12-23 PROCEDURE — 93000 ELECTROCARDIOGRAM COMPLETE: CPT | Performed by: INTERNAL MEDICINE

## 2021-12-23 NOTE — PROGRESS NOTES
Patient comes in for interrogation of their implanted loop recorder. ILR for tachycardia, palpitations. Hx: AVNRT s/p ablation on 2017 and syncope. Last remote on 12.15.2021    Presenting egm displays NSR @ 90-95 bpm.  No new arrhythmias/events/symptom recordings recorded. Ep will review. See Paceart report under the Cardiology tab. We will continue to monitor patient remotely.

## 2022-01-05 RX ORDER — NORGESTIMATE AND ETHINYL ESTRADIOL 7DAYSX3 28
KIT ORAL
Qty: 28 TABLET | Refills: 2 | Status: SHIPPED | OUTPATIENT
Start: 2022-01-05 | End: 2022-06-08 | Stop reason: SDUPTHER

## 2022-01-05 NOTE — TELEPHONE ENCOUNTER
Requested Prescriptions     Pending Prescriptions Disp Refills    Norgestim-Eth Estrad Triphasic (TRI-SPRINTEC) 0.18/0.215/0.25 MG-35 MCG TABS [Pharmacy Med Name: TRI-SPRINTEC TABLETS 28S] 28 tablet 2     Sig: TAKE 1 TABLET BY MOUTH DAILY     Last ov 6/25/20  Last lab 6/28/19

## 2022-01-12 RX ORDER — IVABRADINE 5 MG/1
TABLET, FILM COATED ORAL
Qty: 180 TABLET | Refills: 3 | Status: SHIPPED | OUTPATIENT
Start: 2022-01-12

## 2022-01-19 ENCOUNTER — NURSE ONLY (OUTPATIENT)
Dept: CARDIOLOGY CLINIC | Age: 27
End: 2022-01-19
Payer: COMMERCIAL

## 2022-01-19 DIAGNOSIS — Z45.09 ENCOUNTER FOR LOOP RECORDER CHECK: ICD-10-CM

## 2022-01-19 DIAGNOSIS — R00.2 PALPITATIONS: ICD-10-CM

## 2022-01-21 NOTE — PROGRESS NOTES
ILR for palpitations. 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 Remote transmission received for patients ILR. No new arrhythmias/events recorded. EP physician will review. See interrogation under the cardiology tab in the [address] [place] [address] [address] field for more details. Will continue to monitor remotely.

## 2022-01-24 PROCEDURE — 93298 REM INTERROG DEV EVAL SCRMS: CPT | Performed by: INTERNAL MEDICINE

## 2022-01-24 PROCEDURE — G2066 INTER DEVC REMOTE 30D: HCPCS | Performed by: INTERNAL MEDICINE

## 2022-02-23 ENCOUNTER — NURSE ONLY (OUTPATIENT)
Dept: CARDIOLOGY CLINIC | Age: 27
End: 2022-02-23
Payer: COMMERCIAL

## 2022-02-23 DIAGNOSIS — Z45.09 ENCOUNTER FOR LOOP RECORDER CHECK: ICD-10-CM

## 2022-02-23 DIAGNOSIS — R00.2 PALPITATIONS: ICD-10-CM

## 2022-02-23 NOTE — PROGRESS NOTES
ILR for palpitations. 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 Remote transmission received for patients ILR. No new arrhythmias/events recorded. EP physician will review. See interrogation under the cardiology tab in the [address] [place] [address] [address] field for more details. Will continue to monitor remotely.

## 2022-02-27 PROCEDURE — G2066 INTER DEVC REMOTE 30D: HCPCS | Performed by: INTERNAL MEDICINE

## 2022-02-27 PROCEDURE — 93298 REM INTERROG DEV EVAL SCRMS: CPT | Performed by: INTERNAL MEDICINE

## 2022-04-06 ENCOUNTER — NURSE ONLY (OUTPATIENT)
Dept: CARDIOLOGY CLINIC | Age: 27
End: 2022-04-06
Payer: COMMERCIAL

## 2022-04-06 DIAGNOSIS — R00.2 PALPITATIONS: ICD-10-CM

## 2022-04-06 DIAGNOSIS — Z45.09 ENCOUNTER FOR LOOP RECORDER CHECK: ICD-10-CM

## 2022-04-07 NOTE — PROGRESS NOTES
ILR for palpitations. 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 Remote transmission received for patients ILR. No new arrhythmias/events recorded. EP physician will review. See interrogation under the cardiology tab in the [address] [place] [address] [address] field for more details. Will continue to monitor remotely.

## 2022-04-10 PROCEDURE — G2066 INTER DEVC REMOTE 30D: HCPCS | Performed by: INTERNAL MEDICINE

## 2022-04-10 PROCEDURE — 93298 REM INTERROG DEV EVAL SCRMS: CPT | Performed by: INTERNAL MEDICINE

## 2022-04-12 DIAGNOSIS — R00.2 PALPITATIONS: ICD-10-CM

## 2022-04-12 NOTE — TELEPHONE ENCOUNTER
Requested Prescriptions     Pending Prescriptions Disp Refills    verapamil (CALAN SR) 120 MG extended release tablet [Pharmacy Med Name: VERAPAMIL ER 120MG TABLETS] 90 tablet 3     Sig: TAKE 1 TABLET BY MOUTH DAILY          Number: 90    Refills: 3    Last Office Visit: 12/23/2021     Next Office Visit: 12/22/22

## 2022-04-21 RX ORDER — FLECAINIDE ACETATE 50 MG/1
50 TABLET ORAL 2 TIMES DAILY
Qty: 180 TABLET | Refills: 3 | Status: SHIPPED | OUTPATIENT
Start: 2022-04-21

## 2022-04-21 NOTE — TELEPHONE ENCOUNTER
[facility] Medication Refills:    Medication: flecainide 50 MG    Dosage:  Take 1 tablet by mouth twice daily    Number: 180    Refills: 3    Last Office Visit: 12/23/2021    Next Office Visit: 12/22/2022    Last Labs: 06/28/2019 TSH , CBC, CMP, lipid panel

## 2022-05-11 ENCOUNTER — NURSE ONLY (OUTPATIENT)
Dept: CARDIOLOGY CLINIC | Age: 27
End: 2022-05-11
Payer: COMMERCIAL

## 2022-05-11 DIAGNOSIS — R00.2 PALPITATIONS: ICD-10-CM

## 2022-05-11 DIAGNOSIS — Z45.09 ENCOUNTER FOR LOOP RECORDER CHECK: ICD-10-CM

## 2022-05-11 NOTE — PROGRESS NOTES
ILR for palpitations. 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 Remote transmission received for patients ILR. No new arrhythmias/events recorded. EP physician will review. See interrogation under the cardiology tab in the [address] [place] [address] [address] field for more details. Will continue to monitor remotely.

## 2022-05-13 PROCEDURE — 93298 REM INTERROG DEV EVAL SCRMS: CPT | Performed by: INTERNAL MEDICINE

## 2022-05-13 PROCEDURE — G2066 INTER DEVC REMOTE 30D: HCPCS | Performed by: INTERNAL MEDICINE

## 2022-06-08 ENCOUNTER — OFFICE VISIT (OUTPATIENT)
Dept: PRIMARY CARE CLINIC | Age: 27
End: 2022-06-08
Payer: COMMERCIAL

## 2022-06-08 VITALS
RESPIRATION RATE: 22 BRPM | WEIGHT: 138.5 LBS | DIASTOLIC BLOOD PRESSURE: 71 MMHG | TEMPERATURE: 98 F | HEIGHT: 61 IN | OXYGEN SATURATION: 100 % | BODY MASS INDEX: 26.15 KG/M2 | SYSTOLIC BLOOD PRESSURE: 107 MMHG | HEART RATE: 67 BPM

## 2022-06-08 DIAGNOSIS — L82.1 SEBORRHEIC KERATOSES: ICD-10-CM

## 2022-06-08 DIAGNOSIS — Z30.41 ORAL CONTRACEPTIVE PILL SURVEILLANCE: ICD-10-CM

## 2022-06-08 DIAGNOSIS — Z00.00 PREVENTATIVE HEALTH CARE: Primary | ICD-10-CM

## 2022-06-08 PROBLEM — G90.A POTS (POSTURAL ORTHOSTATIC TACHYCARDIA SYNDROME): Chronic | Status: ACTIVE | Noted: 2019-08-08

## 2022-06-08 PROBLEM — K64.8 INTERNAL HEMORRHOID: Chronic | Status: ACTIVE | Noted: 2022-06-08

## 2022-06-08 PROBLEM — M21.40 FLAT FOOT: Status: ACTIVE | Noted: 2019-10-31

## 2022-06-08 PROBLEM — M21.40 FLAT FOOT: Chronic | Status: ACTIVE | Noted: 2019-10-31

## 2022-06-08 PROBLEM — R00.2 PALPITATIONS: Chronic | Status: ACTIVE | Noted: 2020-02-19

## 2022-06-08 PROBLEM — Q79.62 EHLERS-DANLOS SYNDROME, TYPE 3: Status: ACTIVE | Noted: 2019-10-04

## 2022-06-08 PROBLEM — K64.8 INTERNAL HEMORRHOID: Status: ACTIVE | Noted: 2022-06-08

## 2022-06-08 PROBLEM — Q79.62 EHLERS-DANLOS SYNDROME, TYPE 3: Chronic | Status: ACTIVE | Noted: 2019-10-04

## 2022-06-08 PROBLEM — Z45.09 ENCOUNTER FOR LOOP RECORDER CHECK: Chronic | Status: ACTIVE | Noted: 2021-03-08

## 2022-06-08 PROBLEM — R00.0 TACHYCARDIA: Chronic | Status: ACTIVE | Noted: 2019-07-25

## 2022-06-08 PROCEDURE — 99385 PREV VISIT NEW AGE 18-39: CPT | Performed by: FAMILY MEDICINE

## 2022-06-08 RX ORDER — IBUPROFEN 800 MG/1
TABLET ORAL
COMMUNITY
Start: 2022-04-21

## 2022-06-08 RX ORDER — NORGESTIMATE AND ETHINYL ESTRADIOL 7DAYSX3 28
KIT ORAL
Qty: 28 TABLET | Refills: 2 | Status: SHIPPED | OUTPATIENT
Start: 2022-06-08 | End: 2022-09-30 | Stop reason: SDUPTHER

## 2022-06-08 SDOH — ECONOMIC STABILITY: FOOD INSECURITY: WITHIN THE PAST 12 MONTHS, THE FOOD YOU BOUGHT JUST DIDN'T LAST AND YOU DIDN'T HAVE MONEY TO GET MORE.: NEVER TRUE

## 2022-06-08 SDOH — HEALTH STABILITY: PHYSICAL HEALTH: ON AVERAGE, HOW MANY MINUTES DO YOU ENGAGE IN EXERCISE AT THIS LEVEL?: 60 MIN

## 2022-06-08 SDOH — HEALTH STABILITY: PHYSICAL HEALTH: ON AVERAGE, HOW MANY DAYS PER WEEK DO YOU ENGAGE IN MODERATE TO STRENUOUS EXERCISE (LIKE A BRISK WALK)?: 2 DAYS

## 2022-06-08 SDOH — ECONOMIC STABILITY: FOOD INSECURITY: WITHIN THE PAST 12 MONTHS, YOU WORRIED THAT YOUR FOOD WOULD RUN OUT BEFORE YOU GOT MONEY TO BUY MORE.: NEVER TRUE

## 2022-06-08 ASSESSMENT — SOCIAL DETERMINANTS OF HEALTH (SDOH)
WITHIN THE LAST YEAR, HAVE YOU BEEN KICKED, HIT, SLAPPED, OR OTHERWISE PHYSICALLY HURT BY YOUR PARTNER OR EX-PARTNER?: NO
HOW HARD IS IT FOR YOU TO PAY FOR THE VERY BASICS LIKE FOOD, HOUSING, MEDICAL CARE, AND HEATING?: NOT HARD AT ALL
WITHIN THE LAST YEAR, HAVE YOU BEEN AFRAID OF YOUR PARTNER OR EX-PARTNER?: NO
WITHIN THE LAST YEAR, HAVE YOU BEEN HUMILIATED OR EMOTIONALLY ABUSED IN OTHER WAYS BY YOUR PARTNER OR EX-PARTNER?: NO
WITHIN THE LAST YEAR, HAVE TO BEEN RAPED OR FORCED TO HAVE ANY KIND OF SEXUAL ACTIVITY BY YOUR PARTNER OR EX-PARTNER?: NO

## 2022-06-08 ASSESSMENT — ENCOUNTER SYMPTOMS
CONSTIPATION: 0
DIARRHEA: 0
SHORTNESS OF BREATH: 0
CHEST TIGHTNESS: 0
RHINORRHEA: 0
ABDOMINAL PAIN: 0
SORE THROAT: 0

## 2022-06-08 ASSESSMENT — PATIENT HEALTH QUESTIONNAIRE - PHQ9
SUM OF ALL RESPONSES TO PHQ9 QUESTIONS 1 & 2: 0
SUM OF ALL RESPONSES TO PHQ QUESTIONS 1-9: 0
SUM OF ALL RESPONSES TO PHQ QUESTIONS 1-9: 0
2. FEELING DOWN, DEPRESSED OR HOPELESS: 0
SUM OF ALL RESPONSES TO PHQ QUESTIONS 1-9: 0
SUM OF ALL RESPONSES TO PHQ QUESTIONS 1-9: 0
1. LITTLE INTEREST OR PLEASURE IN DOING THINGS: 0

## 2022-06-08 NOTE — PROGRESS NOTES
Subjective:   Patient ID: Bridget Burk is a 23 y.o. female here today to establish care. HPI by clinical support staff:   Chief Complaint   Patient presents with   Radha Union Establish Care    Skin Tag     on back       Preliminary data above this line collected by clinical support staff.    ______________________________________________________________________  HPI by Provider:   HPI   Presents to establish care- has a mole on her back that she is worried about has grown over the years. 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 History of POTS and Vent Tachycardia status post loop recorder. Cardiologist: Dr Cem Saba   EPS: Dr Drew Armijo to get routine labs. Needs OCP refilled. Data above this line collected by Provider. Patient's medications, allergies, past medical, surgical, social and family histories were reviewed and updated as appropriate. Patient Care Team:  Jada Bravo MD as PCP - General (Family Medicine)  Park Kang MD as Consulting Physician (Obstetrics & Gynecology)  Allergies   Allergen Reactions    Doxycycline Nausea And Vomiting    Gold Other (See Comments), Hives and Rash     Other reaction(s): Skin Discoloration  urticaria       Current Outpatient Medications on File Prior to Visit   Medication Sig Dispense Refill    ibuprofen (ADVIL;MOTRIN) 800 MG tablet       flecainide (TAMBOCOR) 50 MG tablet Take 1 tablet by mouth 2 times daily 180 tablet 3    verapamil (CALAN SR) 120 MG extended release tablet TAKE 1 TABLET BY MOUTH DAILY 90 tablet 3    ivabradine (CORLANOR) 5 MG TABS tablet TAKE 1 TABLET BY MOUTH TWICE DAILY WITH MEALS 180 tablet 3    cetirizine (ZYRTEC) 10 MG tablet Take 10 mg by mouth daily       No current facility-administered medications on file prior to visit. Review of Systems   Constitutional: Negative for activity change, appetite change, fatigue and fever. HENT: Negative for congestion, rhinorrhea and sore throat.     Respiratory: Negative for chest tightness and shortness of breath. Cardiovascular: Negative for chest pain, palpitations and leg swelling. Gastrointestinal: Negative for abdominal pain, constipation and diarrhea. Genitourinary: Negative for dysuria and frequency. Musculoskeletal: Negative for arthralgias. Neurological: Negative for dizziness, weakness and headaches. Psychiatric/Behavioral: Negative for hallucinations. All other systems reviewed and are negative. ROS above this line reviewed by Provider. Objective:   /71 (Site: Left Upper Arm, Position: Sitting, Cuff Size: Medium Adult)   Pulse 67   Temp 98 °F (36.7 °C) (Temporal)   Resp 22   Ht 5' 1\" (1.549 m)   Wt 138 lb 8 oz (62.8 kg)   LMP 06/07/2022   SpO2 100%   BMI 26.17 kg/m²   Physical Exam  Vitals and nursing note reviewed. Constitutional:       General: She is not in acute distress. Appearance: Normal appearance. She is normal weight. She is not ill-appearing, toxic-appearing or diaphoretic. HENT:      Head: Normocephalic and atraumatic. Eyes:      General: No scleral icterus. Conjunctiva/sclera: Conjunctivae normal.   Cardiovascular:      Rate and Rhythm: Normal rate and regular rhythm. Heart sounds: Normal heart sounds. No murmur heard. No friction rub. No gallop. Pulmonary:      Effort: Pulmonary effort is normal. No respiratory distress. Breath sounds: Normal breath sounds. No stridor. No wheezing, rhonchi or rales. Musculoskeletal:      Cervical back: Normal range of motion. Skin:     General: Skin is warm and dry. Neurological:      Mental Status: She is alert.    Psychiatric:         Mood and Affect: Mood normal.         Behavior: Behavior normal.       Lab Results   Component Value Date    WBC 4.1 06/28/2019    HGB 14.2 06/28/2019    HCT 40.4 06/28/2019    MCV 94.6 06/28/2019     06/28/2019     Lab Results   Component Value Date     06/28/2019    K 3.9 06/28/2019    BUN 12 06/28/2019 CREATININE 0.5 06/28/2019    GLUCOSE 77 06/28/2019    CALCIUM 9.8 06/28/2019    BILITOT 0.3 06/28/2019    ALKPHOS 39 06/28/2019    AST 19 06/28/2019    ALT 14 06/28/2019    GFRAA >60 06/28/2019     No results found for: TSHFT4, TSH, FT3  No results found for: LABA1C  No results found for: EAG  Lab Results   Component Value Date    CHOL 171 06/28/2019    TRIG 49 06/28/2019    HDL 70 06/28/2019     No results found for: LABMICR, [id number]  No results found for: VITD25  Assessment and Plan:   1. Preventative health care  - CBC with Auto Differential; Future  - Comprehensive Metabolic Panel; Future  - Hemoglobin A1C; Future  - TSH with Reflex to FT4; Future  - Lipid Panel; Future    2. Seborrheic keratoses  Discussed benign nature- seeing derm soon. 3. Oral contraceptive pill surveillance  - Norgestim-Eth Estrad Triphasic (TRI-SPRINTEC) 0.18/0.215/0.25 MG-35 MCG TABS; TAKE 1 TABLET BY MOUTH DAILY  Dispense: 28 tablet; Refill: 2         This chart note was prepared using a voice recognition dictation program. This note was reviewed for accuracy; however, addition, deletion and sound-alike word errors may occur. If there are any questions regarding this chart note, please contact the originating provider. Electronically signed by   Nevaeh Green MD  6/8/2022   3:24 PM    No follow-ups on file.

## 2022-06-08 NOTE — PATIENT INSTRUCTIONS
or how it looks.      Your skin is badly broken from scratching.      You have signs of infection such as:  ? Pain, warmth, or swelling in your skin. ? Red streaks near a wound in your skin. ? Pus coming from a wound in your skin. ? A fever not due to the flu or other illness. Watch closely for changes in your health, and be sure to contact your doctor if:     You do not get better as expected. Where can you learn more? Go to https://[employer] org and sign in to your [employer] account. Enter Z162 in the [employer] box to learn more about \"Seborrheic Keratosis: Care Instructions. \"     If you do not have an account, please click on the \"Sign Up Now\" link. Current as of: November 15, 2021               Content Version: 13.2  © 4986-4269 [employer]. Care instructions adapted under license by [facility] ([facility]). If you have questions about a medical condition or this instruction, always ask your healthcare professional. [address] any warranty or liability for your use of this information.

## 2022-06-15 ENCOUNTER — NURSE ONLY (OUTPATIENT)
Dept: CARDIOLOGY CLINIC | Age: 27
End: 2022-06-15
Payer: COMMERCIAL

## 2022-06-15 DIAGNOSIS — R00.2 PALPITATIONS: Chronic | ICD-10-CM

## 2022-06-15 DIAGNOSIS — Z45.09 ENCOUNTER FOR LOOP RECORDER CHECK: Chronic | ICD-10-CM

## 2022-06-17 PROCEDURE — G2066 INTER DEVC REMOTE 30D: HCPCS | Performed by: INTERNAL MEDICINE

## 2022-06-17 PROCEDURE — 93298 REM INTERROG DEV EVAL SCRMS: CPT | Performed by: INTERNAL MEDICINE

## 2022-06-17 NOTE — PROGRESS NOTES
ILR for palpitations. 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 Remote transmission received for patients ILR. No new arrhythmias/events recorded. EP physician will review. See interrogation under the cardiology tab in the [address] [place] [address] [address] field for more details. Will continue to monitor remotely.

## 2022-07-20 ENCOUNTER — NURSE ONLY (OUTPATIENT)
Dept: CARDIOLOGY CLINIC | Age: 27
End: 2022-07-20
Payer: COMMERCIAL

## 2022-07-20 DIAGNOSIS — Z45.09 ENCOUNTER FOR LOOP RECORDER CHECK: Primary | Chronic | ICD-10-CM

## 2022-07-20 DIAGNOSIS — R00.2 PALPITATIONS: Chronic | ICD-10-CM

## 2022-07-20 PROCEDURE — G2066 INTER DEVC REMOTE 30D: HCPCS | Performed by: INTERNAL MEDICINE

## 2022-07-20 PROCEDURE — 93298 REM INTERROG DEV EVAL SCRMS: CPT | Performed by: INTERNAL MEDICINE

## 2022-08-24 ENCOUNTER — NURSE ONLY (OUTPATIENT)
Dept: CARDIOLOGY CLINIC | Age: 27
End: 2022-08-24
Payer: COMMERCIAL

## 2022-08-24 DIAGNOSIS — Z45.09 ENCOUNTER FOR LOOP RECORDER CHECK: Primary | Chronic | ICD-10-CM

## 2022-08-24 DIAGNOSIS — R55 SYNCOPE, UNSPECIFIED SYNCOPE TYPE: ICD-10-CM

## 2022-08-24 PROCEDURE — 93298 REM INTERROG DEV EVAL SCRMS: CPT | Performed by: INTERNAL MEDICINE

## 2022-08-24 PROCEDURE — G2066 INTER DEVC REMOTE 30D: HCPCS | Performed by: INTERNAL MEDICINE

## 2022-08-24 NOTE — PROGRESS NOTES
ILR for palpitations. Hx AVNRT s/p ablation 2017 and syncope. Remote transmission received for patients ILR. No new arrhythmias/events recorded. EP physician will review. See interrogation under the cardiology tab in the [address] [place] [address] [address] field for more details. Will continue to monitor remotely. (End of 31-day monitoring period 8/24/22).

## 2022-09-28 ENCOUNTER — NURSE ONLY (OUTPATIENT)
Dept: CARDIOLOGY CLINIC | Age: 27
End: 2022-09-28
Payer: COMMERCIAL

## 2022-09-28 DIAGNOSIS — Z45.09 ENCOUNTER FOR LOOP RECORDER CHECK: Primary | Chronic | ICD-10-CM

## 2022-09-28 DIAGNOSIS — R00.2 PALPITATIONS: Chronic | ICD-10-CM

## 2022-09-28 DIAGNOSIS — R55 SYNCOPE, UNSPECIFIED SYNCOPE TYPE: ICD-10-CM

## 2022-09-28 PROCEDURE — 93298 REM INTERROG DEV EVAL SCRMS: CPT | Performed by: INTERNAL MEDICINE

## 2022-09-28 PROCEDURE — G2066 INTER DEVC REMOTE 30D: HCPCS | Performed by: INTERNAL MEDICINE

## 2022-09-29 DIAGNOSIS — Z30.41 ORAL CONTRACEPTIVE PILL SURVEILLANCE: ICD-10-CM

## 2022-09-30 RX ORDER — NORGESTIMATE AND ETHINYL ESTRADIOL 7DAYSX3 28
KIT ORAL
Qty: 28 TABLET | Refills: 2 | Status: SHIPPED | OUTPATIENT
Start: 2022-09-30

## 2022-11-02 ENCOUNTER — NURSE ONLY (OUTPATIENT)
Dept: CARDIOLOGY CLINIC | Age: 27
End: 2022-11-02
Payer: COMMERCIAL

## 2022-11-02 DIAGNOSIS — R00.0 TACHYCARDIA: Chronic | ICD-10-CM

## 2022-11-02 DIAGNOSIS — Z45.09 ENCOUNTER FOR LOOP RECORDER CHECK: Primary | Chronic | ICD-10-CM

## 2022-11-02 PROCEDURE — G2066 INTER DEVC REMOTE 30D: HCPCS | Performed by: INTERNAL MEDICINE

## 2022-11-02 PROCEDURE — 93298 REM INTERROG DEV EVAL SCRMS: CPT | Performed by: INTERNAL MEDICINE

## 2022-11-03 NOTE — PROGRESS NOTES
ILR for palpitations. Hx AVNRT s/p ablation 2017 and syncope. Remote transmission received for patients ILR. No new arrhythmias/events recorded. EP physician will review. See interrogation under the cardiology tab in the [address] [place] [address] [address] field for more details. Will continue to monitor remotely.      (End of 31-day monitoring period 11/2/22)

## 2022-11-23 ENCOUNTER — NURSE ONLY (OUTPATIENT)
Dept: CARDIOLOGY CLINIC | Age: 27
End: 2022-11-23

## 2022-11-23 NOTE — PROGRESS NOTES
ILR for palpitations. Hx AVNRT s/p ablation 2017 and syncope. Manual remote transmission received for patients ILR d/t Symptom event recorded. Since 10.21.2021, 1 Symptom event recorded 11.23.2022 @ 03:30am w ECG illustrating what appears to be NSR w 5-beat run of NSVT. Pt on flecainide. EP physician will review. See interrogation under the cardiology tab in the [address] [facility] [address] field for more details. Will continue to monitor remotely.

## 2022-12-07 ENCOUNTER — NURSE ONLY (OUTPATIENT)
Dept: CARDIOLOGY CLINIC | Age: 27
End: 2022-12-07
Payer: COMMERCIAL

## 2022-12-07 DIAGNOSIS — R00.2 PALPITATIONS: Chronic | ICD-10-CM

## 2022-12-07 DIAGNOSIS — Z45.09 ENCOUNTER FOR LOOP RECORDER CHECK: Primary | Chronic | ICD-10-CM

## 2022-12-07 PROCEDURE — 93298 REM INTERROG DEV EVAL SCRMS: CPT | Performed by: INTERNAL MEDICINE

## 2022-12-07 PROCEDURE — G2066 INTER DEVC REMOTE 30D: HCPCS | Performed by: INTERNAL MEDICINE

## 2022-12-08 NOTE — PROGRESS NOTES
ILR for palpitations. Hx AVNRT s/p ablation 2017 and syncope. Remote transmission received for patients ILR. 1 Symptom event recorded 11.23.2022 @ 03:30am w ECG illustrating what appears to be NSR w 5-beat run of NSVT (previously reviewed by [facility]). Pt on flecainide. EP physician will review. See interrogation under the cardiology tab in the [address] [place] [address] [address] field for more details. Will continue to monitor remotely.      (End of 31-day monitoring period 12/7/22)

## 2022-12-20 NOTE — PROGRESS NOTES
[address]   Electrophysiology  Office Visit  Date: 12/22/2022    Chief Complaint   Patient presents with    Palpitations    Tachycardia    Dizziness       Cardiac HX: Kvng Whittington is a 33 y.o. woman with a h/o palpitations, SVT/AVNRT, s/p EPS/RFA of AVNRT (7/18/2016, [facility]), POTS, s/p + TTT (7/30/2019), placed on metoprolol, dx'd w/ EDS (2019), follows with onc, recurrent palpitations, monitor (2/2020) showed unifocal s/s PVCs, placed on verapamil, repeat monitor (4/2020) showed occ PVCs and tachycardia, placed on Corlanor 5 mg BID, echo (6/2020) WNL, placed on flecainide (6/2020), s/p ILR (3/8/2021, Dr. Drake Alvarado). Interval History/HPI: Patient is here for follow-up for SVT/AVNRT, PVCs, palpitations and inappropriate sinus tach. Patient has a longstanding history of palpitations. She underwent an EP study and July 2016 and had a catheter ablation for AVNRT at [facility]. She was noted to have tachycardia and underwent a tilt table test in July 2019. She was positive for POTS at that time. She was placed on metoprolol. She developed recurrent palpitations and wore a monitor in February 2020 that showed unifocal symptomatic PVCs. She was placed on verapamil. She then wore a repeat monitor in April 2020 that showed occasional PVCs and tachycardia. She was placed on Corlanor 5 mg twice a day. Her echo in June 2020 was within normal limits. She was placed on flecainide in June 2020. She was then implanted with an ILR in March 2021. Today she states that she has had some episodes of heart racing and palpitations. She has not been able to record them in the past on her device as they happen when she is unable to get to her phone to record. She was able to record 1 episode on her device that showed PVCs and a nonsustained VT lasting 5 beats. She states that she has episodes such as this 2-3 times a month. They are very brief in nature.   They do make her feel dizzy and lightheaded as well as anxious. She has not fallen or passed out with these episodes. Her heart rate appears to be pretty well controlled per her device. Her heart rate is 104 in the office today. She does state that she feels a little anxious with the PVCs and the nonsustained VT. She has no chest pain, shortness of breath, PND, orthopnea or lower extremity edema. She states that she tries to hydrate and I reinforced her continuing to fluid load. She is also exercising. 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:   Current Outpatient Medications on File Prior to Visit   Medication Sig Dispense Refill    Norgestim-Eth Estrad Triphasic (TRI-SPRINTEC) 0.18/0.215/0.25 MG-35 MCG TABS TAKE 1 TABLET BY MOUTH DAILY 28 tablet 2    ibuprofen (ADVIL;MOTRIN) 800 MG tablet       flecainide (TAMBOCOR) 50 MG tablet Take 1 tablet by mouth 2 times daily 180 tablet 3    verapamil (CALAN SR) 120 MG extended release tablet TAKE 1 TABLET BY MOUTH DAILY 90 tablet 3    ivabradine (CORLANOR) 5 MG TABS tablet TAKE 1 TABLET BY MOUTH TWICE DAILY WITH MEALS 180 tablet 3    cetirizine (ZYRTEC) 10 MG tablet Take 10 mg by mouth daily       No current facility-administered medications on file prior to visit.        Past Medical History:   Diagnosis Date    AVNRT (AV eduar re-entry tachycardia) ([facility] [address] 2016    EDS (Vianey-Danlos syndrome)     Headache     Inappropriate sinus tachycardia     Neutropenia ([facility] [address] 2016    PVC (premature ventricular contraction)     Scoliosis 2018    SI (sacroiliac) joint dysfunction 2018    Syncope         Past Surgical History:   Procedure Laterality Date    COLONOSCOPY      INSERTABLE CARDIAC MONITOR  2016    electrophysiology    LYMPH NODE DISSECTION  2016    groin and neck - enlarged    TONSILLECTOMY      TONSILLECTOMY AND ADENOIDECTOMY  2002       Allergies   Allergen Reactions    Doxycycline Nausea And Vomiting    Gold Other (See Comments), Hives and Rash     Other reaction(s): Skin Discoloration  urticaria         Social History:  Reviewed. reports that she has never smoked. She has never used smokeless tobacco. She reports current alcohol use. She reports that she does not use drugs. Family History:  Reviewed. family history includes Asthma in her brother and sister; Breast Cancer in her paternal grandmother; COPD in her maternal grandmother; Cancer in her maternal grandfather; Diabetes in her paternal grandfather; Heart Failure in her maternal grandmother; No Known Problems in her father; Other in her mother; Prostate Cancer in her maternal grandfather. Review of System:    Constitutional: No fevers, chills. Eyes: No visual changes or diplopia. No scleral icterus. ENT: No Headaches. No mouth sores or sore throat. Cardiovascular: No for chest pain, No for dyspnea on exertion, Yes for palpitations or No for loss of consciousness. No cough, hemoptysis, No for pleuritic pain, or phlebitis. Respiratory: No for cough or wheezing. No hematemesis. Gastrointestinal: No abdominal pain, blood in stools. Genitourinary: No dysuria, or hematuria. Musculoskeletal: No gait disturbance,    Integumentary: No rash or pruritis. Neurological: No headache, change in muscle strength, numbness or tingling. Psychiatric: No anxiety, or depression. Endocrine: No temperature intolerance. No excessive thirst, fluid intake, or urination. Hem/Lymph: No abnormal bruising or bleeding, blood clots or swollen lymph nodes. Allergic/Immunologic: No nasal congestion or hives. Physical Examination:  Vitals:    12/22/22 1323   BP: 124/76   Pulse: (!) 104         Wt Readings from Last 3 Encounters:   12/22/22 143 lb 12.8 oz (65.2 kg)   06/08/22 138 lb 8 oz (62.8 kg)   12/23/21 145 lb 12.8 oz (66.1 kg)       Constitutional: Oriented. No distress. Head: Normocephalic and atraumatic. Mouth/Throat: Oropharynx is clear and moist.   Eyes: Conjunctivae clear without jaunduice. PERRL. Neck: Neck supple. No rigidity. No JVD present. Cardiovascular: Normal rate, regular rhythm, S1&S2. Pulmonary/Chest: Bilateral respiratory sounds. No wheezes, No rhonchi. Abdominal: Soft. Bowel sounds present. No distension, No tenderness. Musculoskeletal: No tenderness. No edema    Lymphadenopathy: Has no cervical adenopathy. Neurological: Alert and oriented. Cranial nerve appears intact, No Gross deficit   Skin: Skin is warm and dry. No rash noted. Psychiatric: Has a normal mood, affect and behavior     Labs:  Reviewed. No results for input(s): NA, K, CL, CO2, PHOS, BUN, CREATININE, CA in the last 72 hours. Invalid input(s):  TSH  No results for input(s): WBC, HGB, HCT, MCV, PLT in the last 72 hours. No results found for: CKTOTAL, CKMB, CKMBINDEX, TROPONINI  No results found for: BNP  No results found for: PROTIME, INR  Lab Results   Component Value Date/Time    CHOL 171 06/28/2019 02:33 PM    HDL 70 06/28/2019 02:33 PM    TRIG 49 06/28/2019 02:33 PM     ECG: Personally reviewed: ST, , QRS 88, QTc 429    48 Hour HM: 7/16/2019  Sinus Rhythm with Sinus ArrhythmiaMaximum Heart rate: 160 bpmMinimum Heart rate: 49 bpmRare Premature Atrial complexes Rare Premature Ventricular complexes    30 day Event monitor (2/19-3/20/20):  SR with unifocal, symptomatic PVC's, blocked PACs, and nonsustained SVT    ECHO: 6/16/2020  Summary  Left ventricular cavity size is normal. Normal left ventricular wall thickness. Overall left ventricular systolic function appears normal. Ejection fraction is visually estimated to be 55-60%. No regional wall motion abnormalities are noted. Normal diastolic function. Trivial mitral and pulmonic regurgitation. Mild tricuspid regurgitation with an RVSP of 34mmHg.     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 Tachycardia    2. Palpitations    3. Inappropriate sinus tachycardia    4. Implantable loop recorder present    5. Encounter for monitoring flecainide therapy    6. PVC's (premature ventricular contractions)    7. NSVT (nonsustained ventricular tachycardia)      Cardiac HX: Sebastian Mcbride is a 33 y.o. woman with a h/o palpitations, SVT/AVNRT, s/p EPS/RFA of AVNSRT (7/18/2016, [facility]), POTS, s/p + TTT (7/30/2019), placed on metoprolol, dx'd w/ EDS, follows with onc, recurrent palpitations, monitor (2/2020) showed unifocal s/s PVCs, placed on verapamil, repeat monitor (4/2020) showed occ PVCs and tachycardia, placed on Corlanor 5 mg BID, echo (6/2020) WNL, placed on flecainide (6/2020), s/p ILR (3/8/2021, Dr. Huy Kraft). Palpitations/SVT/PVCs  - In ST  - S/p ILR  - Review of her device today shows 1 patient activated event for NSVT lasting 5 beats in length with 1l PVC, occasional ST  - Patient to try continue to monitor and record her episodes of palpitations so we can quantify her amount of NSVT. - On flecainide 100 mg twice a day - QRS 88  - On verapamil 120 mg daily  - F/u in 6 months  - ECG ordered and results personally reviewed     POTS  - HR today 104, patient states it runs 70 to stay 80s at home  - ILR shows her heart rate is controlled                                           - On Corlanor 5 mg BID w/ s/s improvement     EF of 26-12%  No systolic HF  No known CAD  No known AF   No Tobacco use. All questions and concerns were addressed to the patient/family. Alternatives to my treatment were discussed. The note was completed using EMR. Every effort was made to ensure accuracy; however, inadvertent computerized transcription errors may be present.      Patient received education regarding their diagnosis, treatment and medications while in the office today. Angelique Narvaez [facility]    I  have spent 40 minutes in care of the patient including direct face to face time, chart preparation, reviewing diagnostic testing, other provider notes and coordinating patient care.

## 2022-12-22 ENCOUNTER — OFFICE VISIT (OUTPATIENT)
Dept: CARDIOLOGY CLINIC | Age: 27
End: 2022-12-22
Payer: COMMERCIAL

## 2022-12-22 ENCOUNTER — NURSE ONLY (OUTPATIENT)
Dept: CARDIOLOGY CLINIC | Age: 27
End: 2022-12-22

## 2022-12-22 VITALS
WEIGHT: 143.8 LBS | DIASTOLIC BLOOD PRESSURE: 76 MMHG | BODY MASS INDEX: 27.17 KG/M2 | SYSTOLIC BLOOD PRESSURE: 124 MMHG | HEART RATE: 104 BPM

## 2022-12-22 DIAGNOSIS — Z51.81 ENCOUNTER FOR MONITORING FLECAINIDE THERAPY: ICD-10-CM

## 2022-12-22 DIAGNOSIS — R00.0 INAPPROPRIATE SINUS TACHYCARDIA: ICD-10-CM

## 2022-12-22 DIAGNOSIS — I47.29 NSVT (NONSUSTAINED VENTRICULAR TACHYCARDIA): ICD-10-CM

## 2022-12-22 DIAGNOSIS — I49.3 PVC'S (PREMATURE VENTRICULAR CONTRACTIONS): ICD-10-CM

## 2022-12-22 DIAGNOSIS — Z95.818 IMPLANTABLE LOOP RECORDER PRESENT: ICD-10-CM

## 2022-12-22 DIAGNOSIS — R00.0 TACHYCARDIA: Primary | ICD-10-CM

## 2022-12-22 DIAGNOSIS — R00.2 PALPITATIONS: ICD-10-CM

## 2022-12-22 DIAGNOSIS — Z79.899 ENCOUNTER FOR MONITORING FLECAINIDE THERAPY: ICD-10-CM

## 2022-12-22 PROCEDURE — 99215 OFFICE O/P EST HI 40 MIN: CPT | Performed by: NURSE PRACTITIONER

## 2022-12-22 PROCEDURE — 93000 ELECTROCARDIOGRAM COMPLETE: CPT | Performed by: NURSE PRACTITIONER

## 2022-12-22 NOTE — PATIENT INSTRUCTIONS
Neurocardiogenic Syncope  A patient's guide to neurocardiogenic syncope. Neurocardiogenic Syncope, Vasodepressor Syndrome, Postural Orthostatic Tachycardia Syndrome and Vasovagal Syncope -    All refer to the same phenomenon. Despite what name they go by, the cause and treatment are essentially the same, and for practical purposes, not worth differentiating. Below, they will be referred to as Neurocardiogenic Syncope. What is it? Neurocardiogenic Syncope is most commonly discovered in adolescence and in older adults. It is essentially a failure of the brain and the cardiovascular system (blood vessels) to adequately communicate and respond to each other. This is not a \" heart problem\" or \"heart defect\" nor is it a life-threatening illness. Because of the way we are made, it is easiest for blood to pool in the extremities due to gravity. It requires work (messages sent by the brain, contraction of the blood vessels, and pumping of the heart) to send blood to our heart and brain. There are a number of forces that work against blood returning to the central part of the body (vital organs and the brain). These include: Gravity, amount of fluid in the vessels, dilation of the blood vessels, neuro transmitters (chemicals in the brain that allow nerves to communicate) and even barometric pressure. In addition, people who have this problem tend to diurese (urinate or lose body water) more than they should. How does it happen? Normally when we stand up, our brain sends a signal to the blood vessels in our legs to open wider or dilate. When the blood vessels in the legs dilate, blood may pool in the legs so that the normal volume of blood does not return to the heart. If the heart doesn't pump enough blood to supply the brain, it can cause one to become lightheaded or faint.      Sometimes the heart tries to compensate in raising the blood pressure by increasing the rate and the force of its contraction. This can actually make the problem worse. As the heart beats faster and stronger it sends the wrong message that the ventricles (bottom chambers of the heart) are filled with blood when they are not. Other receptors send wrong messages that the blood pressure is too high when in fact the ventricles are not full and the blood pressure is actually low. This in turn causes the brain to slow the heart rate and lower the blood pressure. This can cause the blood pressure to drop even lower and the risk of fainting increases. For most people, this process of returning blood to the central part of the body works sufficiently and without our notice, just like breathing. When this process does not work well, the following symptoms can be experienced: dizziness, lightheadedness, fainting, chest pain, heart racing, sweating, feeling too hot or too cold, rapid swings in body temperature, nausea, abdominal pain, GI problems, muscle aches or pains, fatigue, depression or inappropriate or exaggerated emotional responses to name a few. Because there are so many factors that affect blood return to the central organs, an affected person may be fine one minute and have significant troubles the next. Also, if someone has had this condition for a long time, they may not know that what their body does is abnormal - sort of like not noticing when your eyesight decreases as you age. You are not alone. Many people have been told they are \"crazy\" because a physician cannot find anything physically wrong to explain the symptoms they are having. Many health care providers do not know about this condition as it was not well understood until 1989, and at that time only the severe cases were identified - those who passed out frequently. Now we know that you may suffer from this condition even if you do not pass out.   Some people develop Neurocardiogenic Syncope abruptly in their teen years and symptoms can resolve just as abruptly. Some people have Neurocardiogenic Syncope forever however the symptoms wax and wane. For some it is hereditary, for others it is not. Do not be surprised if someone else in the family has some of the same symptoms as you do. Neurocardiogenic syncope can range from mild to incapacitating (bedridden). There is nothing that anyone did to cause this and nothing can be done to prevent from having this. It just is. Some ways to help identify this condition besides just symptoms include: checking for changes in blood pressure and/or pulse between sitting and standing (stress of gravity) and a tilt table test.  The tilt table test provides prolonged gravitational stress to the cardiovascular system (heart, blood vessels and brain) to reproduce the symptoms. Blood pressure, pulse and heart rhythm are monitored during this test.    Treatment. Treatment consists of understanding the condition, increasing salt and fluid intake and prescription medications. Knowing you have this problem and what makes your symptoms better or worse is the first step. Physical illness, psychological stress, allergies (histamines cause blood vessels to dilate), hormonal changes (such as before or during the menstrual cycle), dehydration and barometric pressure changes (including weather changes, flying or being in high altitudes) can make symptoms worse. Position is important because of gravity. You will have fewer symptoms when sitting or lying down than when standing. If still symptomatic when lying down, raise your legs higher than your heart. This allows the blood to flow back to the heart. Moving your muscles also helps as the muscles squeeze the blood vessels and help return blood to the brain. (This is why many of us sway or squeeze our calves when standing). Good cardiovascular exercise is important as it will help decrease the symptoms.   Regular aerobic exercise should be done for 40 minutes at least 3 times a week when it doesn't re-create symptoms. Take warm or cool showers instead of hot showers. When you are hot, the blood vessels in your arms and legs dilate to cool you down. Dilated blood vessels keep more blood away from your central organs and may make you more symptomatic. People with Neurocardiogenic Syncope need a lot more fluid and salt every day,  more so than \"normal\" people. When feeling symptomatic, even more fluid and salt are needed. Increasing fluid intake is important so there is enough volume in the blood vessels to return to the brain. It is a lot like trying to fill a sink without having a stopper in the drain. It can be done, but it will take a lot of fluid. Salt helps keep fluid in the blood vessels longer. If you have a normal heart and kidneys, extra salt causes no problem. When you are symptomatic, sit down and drink a sports drink (they are loaded with sodium and other electrolytes) and eat something salty. In 15 minutes, you should feel better although your symptoms may not be completely gone. The sooner you treat your symptoms the easier it is to make them go away. The longer you ignore them, the harder it is to get rid of them - it will take a lot more fluid and salt. Some medications may be prescribed for your symptoms:  - Mineral corticoids - such as fludrocortisone, help the body retain salt and water. Side effects include: elevated blood pressure, weight gain and edema.  - Beta blockers - such as metoprolol, block adrenaline that increases the heart rate and constricts the blood vessels. Side effects include: hypotension or low blood pressure and bradycardia or low heart rate. - Selective serotonin reuptake inhibitors (SSRI's) - such as fluoxetine or sertraline, help suppress the sympathetic nervous system, raise blood pressure and may treat accompanying chest pain. Side effects include: dry mouth, nausea and insomnia.   - Alpha-adrenergic agonists - such as needed drain, constrict the blood vessels, which decreases the pooling of blood in the lower extremities. Side effects include: Hypertension or high blood pressure, dizziness and urinary retention. Compression stockings may be ordered to keep the blood from pooling in the feet and legs. Avoid standing in one position or place for long time. If you do not have any choice in standing, exercising your legs as you stand will help the blood flow back to the central organs. Diet changes can help relieve low blood pressure. Refined carbohydrates (such as flour and sugar) can lower blood pressure. Some foods may trigger symptoms. Avoid heavy meals and alcohol as these may trigger symptoms. Neurocardiogenic Syncope can be controlled. The sooner you learn to recognize your symptoms and treat them appropriately, the quicker you will feel better. Medication may be added to control your symptoms if you are unable to adequately hydrate and increase your salt intake. It may take a while to see the effects of medications, so be patient. Sometimes these medications may have to be changed and/or adjusted several times to get the proper control. Often times, even more than one medication may be necessary. Even when you're under good control, you may have occasional bad days when you need to use a lot more salt and fluids than usual.  Take them in stride, this does not mean you're having a relapse.       Yamileth Houser [address]

## 2022-12-22 NOTE — PROGRESS NOTES
ILR for palpitations. Hx AVNRT s/p ablation 2017 and syncope. Last remote 12.7.2022 (1 Symptom event recorded 11.23.2022 @ 03:30am w ECG illustrating what appears to be NSR w 5-beat run of NSVT (previously reviewed by [facility]). Interrogation shows no new arrhythmias(s)/symptom(s). Current ECG displays -115 bpm.  Patient remains on flecainide, verapamil. EP physician will review. See Paceart report under the Cardiology tab. Updated time and AT/AF threshold changed to All. Patient will see NPFW today. We will continue to monitor remotely.

## 2022-12-27 DIAGNOSIS — Z30.41 ORAL CONTRACEPTIVE PILL SURVEILLANCE: ICD-10-CM

## 2022-12-29 RX ORDER — NORGESTIMATE AND ETHINYL ESTRADIOL 7DAYSX3 28
KIT ORAL
Qty: 28 TABLET | Refills: 2 | Status: SHIPPED | OUTPATIENT
Start: 2022-12-29

## 2023-01-03 ENCOUNTER — TELEPHONE (OUTPATIENT)
Dept: CARDIOLOGY CLINIC | Age: 28
End: 2023-01-03

## 2023-01-03 NOTE — TELEPHONE ENCOUNTER
Called and [facility] for patient. Had reviewed with Dr. Franky Escobar and patient was questioning whether she needed an MRI or whether she had ARVC. We reviewed her echo which showed a normal right ventricle. There was no enlargement or fat pad noted. We also reviewed her EKG her T wave was upright in V1 and V2, no epsilon wave or negative deflection with a PVC which would indicate ARVC. Asked patient to call with any questions.

## 2023-01-31 ENCOUNTER — NURSE ONLY (OUTPATIENT)
Dept: CARDIOLOGY CLINIC | Age: 28
End: 2023-01-31
Payer: COMMERCIAL

## 2023-01-31 DIAGNOSIS — Z45.09 ENCOUNTER FOR LOOP RECORDER CHECK: Primary | Chronic | ICD-10-CM

## 2023-01-31 DIAGNOSIS — R00.0 TACHYCARDIA: Chronic | ICD-10-CM

## 2023-01-31 PROCEDURE — G2066 INTER DEVC REMOTE 30D: HCPCS | Performed by: INTERNAL MEDICINE

## 2023-01-31 PROCEDURE — 93298 REM INTERROG DEV EVAL SCRMS: CPT | Performed by: INTERNAL MEDICINE

## 2023-02-06 NOTE — PROGRESS NOTES
ILR for palpitations. Hx AVNRT s/p ablation 2017 and syncope. Remote transmission received for patients ILR. No arrhythmias/ or events. EP physician will review. See interrogation under the cardiology tab in the [address] [place] [address] [address] field for more details. Will continue to monitor remotely.      (End of 31-day monitoring period 1/31/23)

## 2023-03-20 ENCOUNTER — NURSE ONLY (OUTPATIENT)
Dept: CARDIOLOGY CLINIC | Age: 28
End: 2023-03-20
Payer: COMMERCIAL

## 2023-03-20 DIAGNOSIS — R00.2 PALPITATIONS: Chronic | ICD-10-CM

## 2023-03-20 DIAGNOSIS — Z45.09 ENCOUNTER FOR LOOP RECORDER CHECK: Primary | Chronic | ICD-10-CM

## 2023-03-20 DIAGNOSIS — R55 SYNCOPE, UNSPECIFIED SYNCOPE TYPE: ICD-10-CM

## 2023-03-20 PROCEDURE — G2066 INTER DEVC REMOTE 30D: HCPCS | Performed by: INTERNAL MEDICINE

## 2023-03-20 PROCEDURE — 93298 REM INTERROG DEV EVAL SCRMS: CPT | Performed by: INTERNAL MEDICINE

## 2023-03-20 NOTE — PROGRESS NOTES
ILR for palpitations. Hx AVNRT s/p ablation 2017 and syncope. Remote transmission received for patients ILR. No arrhythmias/ or events. EP physician will review. See interrogation under the cardiology tab in the [address] [place] [address] [address] field for more details. Will continue to monitor remotely.      (End of 31-day monitoring period 3/20/23)

## 2023-03-27 DIAGNOSIS — Z30.41 ORAL CONTRACEPTIVE PILL SURVEILLANCE: ICD-10-CM

## 2023-03-28 NOTE — TELEPHONE ENCOUNTER
Medication:   Requested Prescriptions     Pending Prescriptions Disp Refills    Norgestim-Eth Estrad Triphasic (TRI-SPRINTEC) 0.18/0.215/0.25 MG-35 MCG TABS [Pharmacy Med Name: TRI-SPRINTEC TABLETS 28S] 28 tablet 2     Sig: TAKE 1 TABLET BY MOUTH DAILY        Last Filled:      Patient Phone Number: [phone] (home)     Last appt: 6/8/2022   Next appt: Visit date not found    Last OARRS: No flowsheet data found.

## 2023-03-29 DIAGNOSIS — Z30.41 ORAL CONTRACEPTIVE PILL SURVEILLANCE: ICD-10-CM

## 2023-03-29 RX ORDER — NORGESTIMATE AND ETHINYL ESTRADIOL 7DAYSX3 28
KIT ORAL
Qty: 28 TABLET | Refills: 2 | Status: SHIPPED | OUTPATIENT
Start: 2023-03-29

## 2023-03-29 RX ORDER — NORGESTIMATE AND ETHINYL ESTRADIOL 7DAYSX3 28
KIT ORAL
Qty: 28 TABLET | Refills: 2 | Status: SHIPPED | OUTPATIENT
Start: 2023-03-29 | End: 2023-03-29 | Stop reason: SDUPTHER

## 2023-04-21 DIAGNOSIS — R00.2 PALPITATIONS: ICD-10-CM

## 2023-05-08 ENCOUNTER — NURSE ONLY (OUTPATIENT)
Dept: CARDIOLOGY CLINIC | Age: 28
End: 2023-05-08

## 2023-05-08 DIAGNOSIS — R00.2 PALPITATIONS: Chronic | ICD-10-CM

## 2023-05-08 DIAGNOSIS — Z45.09 ENCOUNTER FOR LOOP RECORDER CHECK: Primary | Chronic | ICD-10-CM

## 2023-05-09 RX ORDER — FLECAINIDE ACETATE 50 MG/1
50 TABLET ORAL 2 TIMES DAILY
Qty: 180 TABLET | Refills: 3 | Status: SHIPPED | OUTPATIENT
Start: 2023-05-09

## 2023-05-09 NOTE — TELEPHONE ENCOUNTER
Requested Prescriptions     Pending Prescriptions Disp Refills    flecainide (TAMBOCOR) 50 MG tablet 180 tablet 3     Sig: Take 1 tablet by mouth 2 times daily          Last Office Visit: 12/22/2022     Next Office Visit: 6/26/2023

## 2023-05-16 NOTE — TELEPHONE ENCOUNTER
Requested Prescriptions     Pending Prescriptions Disp Refills    ivabradine (CORLANOR) 5 MG TABS tablet 180 tablet 3     Sig: TAKE 1 TABLET BY MOUTH TWICE DAILY WITH MEALS      Last Office Visit: 12/22/2022     Next Office Visit: 6/26/2023

## 2023-05-17 NOTE — PROGRESS NOTES
ILR for palpitations. Hx AVNRT s/p ablation 2017 and syncope. Remote transmission received for patients ILR. 1 Symptom event recorded w ECG illustrating what appears to be NSR w possible SVT runs/sinus arrhythmia. Pt on Corlanor, flecainide, verapamil. End of 31-day monitoring period 5/8/23. EP physician will review. See interrogation under the cardiology tab in the [address] [facility] [address] field for more details. Will continue to monitor remotely.

## 2023-06-16 NOTE — TELEPHONE ENCOUNTER
Requested Prescriptions     Pending Prescriptions Disp Refills    ivabradine (CORLANOR) 5 MG TABS tablet 180 tablet 3     Sig: TAKE 1 TABLET BY MOUTH TWICE DAILY WITH MEALS          Number: 180    Refills: 3    Last Office Visit: 12/22/2022     Next Office Visit: 6/26/2023

## 2023-06-16 NOTE — TELEPHONE ENCOUNTER
LVM for patient to confirm if refill is needed. This was recently sent to [address] [place] [address]. Will await her return call to confirm pharmacy as well.

## 2023-06-26 ENCOUNTER — NURSE ONLY (OUTPATIENT)
Dept: CARDIOLOGY CLINIC | Age: 28
End: 2023-06-26

## 2023-06-26 DIAGNOSIS — Z45.09 ENCOUNTER FOR LOOP RECORDER CHECK: Primary | Chronic | ICD-10-CM

## 2023-06-26 DIAGNOSIS — R00.2 PALPITATIONS: Chronic | ICD-10-CM

## 2023-06-26 DIAGNOSIS — R55 SYNCOPE, UNSPECIFIED SYNCOPE TYPE: Chronic | ICD-10-CM

## 2023-07-03 DIAGNOSIS — R00.2 PALPITATIONS: ICD-10-CM

## 2023-07-27 NOTE — PROGRESS NOTES
[facility]   Cardiac Electrophysiology Consultation   Date: 8/22/2023  Reason for Consultation:  POTS  Consult Requesting Physician: No att. providers found     Chief Complaint:   Chief Complaint   Patient presents with    6 Month Follow-Up      HPI: Mila Webber is a 33 y.o. female with PMH significant for AVNRT, s/p ablation (2017), syncope, s/p positive tilt table study for POTS (7/30/19), on metoprolol, EDS, palpitations, event monitor (2/2020) showed unifocal, symptomatic PVC's (no burden), verapamil started, repeat monitor (4/2020) showed PVCs (<1%) and tachycardia, Corlanor started. Echo (6/2020) was normal, started on flecainide (6/2020), s/p ILR (3/8/21). Interval History: Today, she is here for 6 mo f/u. She has been doing well for the past 6 months. Denies complaints of palpitations, dizziness, CP, SOB, orthopnea, BLE swelling, or syncope. 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 Interrogation of ILR shows no new arrhythmias(s)/symptom(s) since last remote. 3 old symptom recording on 04.26.2023 appearing NSR w/brief runs of SVT/sinus arrhythmia(s). Assessment:  1. Syncope  2. Palpitations, symptomatic PVCs, on verapamil, flecainide  3. POTS  4.  AVNRT, s/p ablation (2017)  5. IST, on Corlanor  6.   S/p ILR     Plan:  No changes in current medications  Continue monthly interrogations of ILR  Follow up in 6 months with EP NP     Past Medical History:   Diagnosis Date    AVNRT (AV eduar re-entry tachycardia) ([address]) 2016    EDS (Vianey-Danlos syndrome)     Headache     Inappropriate sinus tachycardia     Neutropenia ([address]) 2016    PVC (premature ventricular contraction)     Scoliosis 2018    SI (sacroiliac) joint dysfunction 2018    Syncope         Past Surgical History:   Procedure Laterality Date    COLONOSCOPY      INSERTABLE CARDIAC MONITOR  2016    electrophysiology    LYMPH NODE DISSECTION  2016    groin and neck - enlarged    TONSILLECTOMY      TONSILLECTOMY AND

## 2023-08-14 NOTE — TELEPHONE ENCOUNTER
Requested Prescriptions     Pending Prescriptions Disp Refills    ivabradine (CORLANOR) 5 MG TABS tablet 180 tablet 3     Sig: TAKE 1 TABLET BY MOUTH TWICE DAILY WITH MEALS            Last Office Visit: 12/22/2022     Next Office Visit: 8/22/2023       Last Labs:most current order 6/8/22, most current result 3/8/2021

## 2023-08-17 ENCOUNTER — TELEPHONE (OUTPATIENT)
Dept: CARDIOLOGY CLINIC | Age: 28
End: 2023-08-17

## 2023-08-17 NOTE — TELEPHONE ENCOUNTER
Submitted PA for [facility]  [facility]  Key: [id number] [facility] STATUS: PENDING. Follow up done daily; if no response in three days we will refax for status check. If another three days goes by with no response we will call the insurance for status.

## 2023-08-22 ENCOUNTER — OFFICE VISIT (OUTPATIENT)
Dept: CARDIOLOGY CLINIC | Age: 28
End: 2023-08-22
Payer: COMMERCIAL

## 2023-08-22 ENCOUNTER — NURSE ONLY (OUTPATIENT)
Dept: CARDIOLOGY CLINIC | Age: 28
End: 2023-08-22

## 2023-08-22 VITALS
SYSTOLIC BLOOD PRESSURE: 120 MMHG | BODY MASS INDEX: 27.89 KG/M2 | WEIGHT: 147.6 LBS | DIASTOLIC BLOOD PRESSURE: 70 MMHG | HEART RATE: 94 BPM

## 2023-08-22 DIAGNOSIS — I47.19 AVNRT (AV NODAL RE-ENTRY TACHYCARDIA): ICD-10-CM

## 2023-08-22 DIAGNOSIS — R55 SYNCOPE AND COLLAPSE: ICD-10-CM

## 2023-08-22 DIAGNOSIS — R00.2 PALPITATIONS: Chronic | ICD-10-CM

## 2023-08-22 DIAGNOSIS — G90.A POTS (POSTURAL ORTHOSTATIC TACHYCARDIA SYNDROME): ICD-10-CM

## 2023-08-22 DIAGNOSIS — Z95.818 IMPLANTABLE LOOP RECORDER PRESENT: ICD-10-CM

## 2023-08-22 DIAGNOSIS — I49.3 PVC'S (PREMATURE VENTRICULAR CONTRACTIONS): ICD-10-CM

## 2023-08-22 DIAGNOSIS — Z45.09 ENCOUNTER FOR LOOP RECORDER CHECK: Primary | Chronic | ICD-10-CM

## 2023-08-22 DIAGNOSIS — I47.11 INAPPROPRIATE SINUS TACHYCARDIA: Primary | ICD-10-CM

## 2023-08-22 DIAGNOSIS — R00.0 TACHYCARDIA: Chronic | ICD-10-CM

## 2023-08-22 DIAGNOSIS — R55 SYNCOPE, UNSPECIFIED SYNCOPE TYPE: Chronic | ICD-10-CM

## 2023-08-22 PROCEDURE — 93000 ELECTROCARDIOGRAM COMPLETE: CPT | Performed by: INTERNAL MEDICINE

## 2023-08-22 PROCEDURE — 99214 OFFICE O/P EST MOD 30 MIN: CPT | Performed by: INTERNAL MEDICINE

## 2023-08-31 PROCEDURE — 93298 REM INTERROG DEV EVAL SCRMS: CPT | Performed by: INTERNAL MEDICINE

## 2023-08-31 PROCEDURE — G2066 INTER DEVC REMOTE 30D: HCPCS | Performed by: INTERNAL MEDICINE

## 2023-09-25 ENCOUNTER — TELEPHONE (OUTPATIENT)
Dept: CARDIOLOGY CLINIC | Age: 28
End: 2023-09-25

## 2023-09-25 NOTE — TELEPHONE ENCOUNTER
Patient called stating she was prescribed verapamil (CALAN SR) 120 MG extended release tablet and her pharmacy says that the dosage is not available in tablet form and needs a new prescription for capsules. Please send new prescription to [facility] on [address]  Patient can be reached on cell number listed

## 2023-09-25 NOTE — TELEPHONE ENCOUNTER
Spoke with pharmacist at [facility]. Issue was not needing a capsule instead of tablet. They were out of stock, expecting it to come in tomorrow. They will call the pt and let her know the med will be available tomorrow.

## 2023-10-05 PROCEDURE — 93298 REM INTERROG DEV EVAL SCRMS: CPT | Performed by: INTERNAL MEDICINE

## 2023-10-05 PROCEDURE — G2066 INTER DEVC REMOTE 30D: HCPCS | Performed by: INTERNAL MEDICINE

## 2023-11-03 NOTE — PATIENT INSTRUCTIONS
--Consider purchasing [employer]'s Kardimobile 6 lead device to give you and your cardiology team better info. --Find vaccine records. You can take snap shot and upload to a [employer] message. --Bring paper script with coupon either to [place] or [address] [place] [address] out to [facility] if the hemorroidal issues are no better. Denied    Your PA request has been denied. Additional information will be provided in the denial communication. (Message [phone])   Case ID: [id number]      Payer: [facility]    [phone]   Electronic appeal: Not supported   Your PA request has been denied. Additional information will be provided in the denial communication.  (Message [phone])

## 2023-11-09 PROCEDURE — G2066 INTER DEVC REMOTE 30D: HCPCS | Performed by: INTERNAL MEDICINE

## 2023-11-09 PROCEDURE — 93298 REM INTERROG DEV EVAL SCRMS: CPT | Performed by: INTERNAL MEDICINE

## 2023-12-12 ENCOUNTER — PATIENT MESSAGE (OUTPATIENT)
Dept: PRIMARY CARE CLINIC | Age: 28
End: 2023-12-12

## 2023-12-12 DIAGNOSIS — Z30.41 ORAL CONTRACEPTIVE PILL SURVEILLANCE: ICD-10-CM

## 2023-12-12 RX ORDER — NORGESTIMATE AND ETHINYL ESTRADIOL 7DAYSX3 28
KIT ORAL
Qty: 28 TABLET | Refills: 2 | Status: CANCELLED | OUTPATIENT
Start: 2023-12-12

## 2023-12-12 NOTE — TELEPHONE ENCOUNTER
Patient has not been seen since 6/8/2022.   Needs appointment for the request on:  NORGESTIM-ETH ESTRAD TRIPHASIC 0.18/0.215/0.25 MG-35 MCG PO TABS  [employer] message sent

## 2023-12-14 PROCEDURE — G2066 INTER DEVC REMOTE 30D: HCPCS | Performed by: INTERNAL MEDICINE

## 2023-12-14 PROCEDURE — 93298 REM INTERROG DEV EVAL SCRMS: CPT | Performed by: INTERNAL MEDICINE

## 2023-12-14 RX ORDER — NORGESTIMATE AND ETHINYL ESTRADIOL 7DAYSX3 28
KIT ORAL
Qty: 28 TABLET | Refills: 2 | Status: SHIPPED | OUTPATIENT
Start: 2023-12-14

## 2024-01-16 ENCOUNTER — OFFICE VISIT (OUTPATIENT)
Dept: PRIMARY CARE CLINIC | Age: 29
End: 2024-01-16
Payer: COMMERCIAL

## 2024-01-16 VITALS
HEART RATE: 74 BPM | OXYGEN SATURATION: 98 % | SYSTOLIC BLOOD PRESSURE: 106 MMHG | WEIGHT: 146.3 LBS | RESPIRATION RATE: 16 BRPM | BODY MASS INDEX: 27.62 KG/M2 | DIASTOLIC BLOOD PRESSURE: 70 MMHG | TEMPERATURE: 98.1 F | HEIGHT: 61 IN

## 2024-01-16 DIAGNOSIS — Z00.00 ENCOUNTER FOR WELL ADULT EXAM WITHOUT ABNORMAL FINDINGS: Primary | ICD-10-CM

## 2024-01-16 DIAGNOSIS — Z00.00 ENCOUNTER FOR WELL ADULT EXAM WITHOUT ABNORMAL FINDINGS: ICD-10-CM

## 2024-01-16 LAB
ALBUMIN SERPL-MCNC: 4.1 G/DL (ref 3.4–5)
ALBUMIN/GLOB SERPL: 1.4 {RATIO} (ref 1.1–2.2)
ALP SERPL-CCNC: 45 U/L (ref 40–129)
ALT SERPL-CCNC: 18 U/L (ref 10–40)
ANION GAP SERPL CALCULATED.3IONS-SCNC: 9 MMOL/L (ref 3–16)
AST SERPL-CCNC: 20 U/L (ref 15–37)
BILIRUB SERPL-MCNC: 0.3 MG/DL (ref 0–1)
BUN SERPL-MCNC: 12 MG/DL (ref 7–20)
CALCIUM SERPL-MCNC: 9 MG/DL (ref 8.3–10.6)
CHLORIDE SERPL-SCNC: 103 MMOL/L (ref 99–110)
CHOLEST SERPL-MCNC: 193 MG/DL (ref 0–199)
CO2 SERPL-SCNC: 27 MMOL/L (ref 21–32)
CREAT SERPL-MCNC: 0.7 MG/DL (ref 0.6–1.1)
GFR SERPLBLD CREATININE-BSD FMLA CKD-EPI: >60 ML/MIN/{1.73_M2}
GLUCOSE SERPL-MCNC: 83 MG/DL (ref 70–99)
HDLC SERPL-MCNC: 67 MG/DL (ref 40–60)
LDLC SERPL CALC-MCNC: 116 MG/DL
POTASSIUM SERPL-SCNC: 3.9 MMOL/L (ref 3.5–5.1)
PROT SERPL-MCNC: 7.1 G/DL (ref 6.4–8.2)
SODIUM SERPL-SCNC: 139 MMOL/L (ref 136–145)
TRIGL SERPL-MCNC: 52 MG/DL (ref 0–150)
TSH SERPL DL<=0.005 MIU/L-ACNC: 1.35 UIU/ML (ref 0.27–4.2)
VLDLC SERPL CALC-MCNC: 10 MG/DL

## 2024-01-16 PROCEDURE — 99395 PREV VISIT EST AGE 18-39: CPT | Performed by: FAMILY MEDICINE

## 2024-01-16 SDOH — ECONOMIC STABILITY: HOUSING INSECURITY
IN THE LAST 12 MONTHS, WAS THERE A TIME WHEN YOU DID NOT HAVE A STEADY PLACE TO SLEEP OR SLEPT IN A SHELTER (INCLUDING NOW)?: NO

## 2024-01-16 SDOH — ECONOMIC STABILITY: INCOME INSECURITY: HOW HARD IS IT FOR YOU TO PAY FOR THE VERY BASICS LIKE FOOD, HOUSING, MEDICAL CARE, AND HEATING?: NOT VERY HARD

## 2024-01-16 SDOH — ECONOMIC STABILITY: FOOD INSECURITY: WITHIN THE PAST 12 MONTHS, YOU WORRIED THAT YOUR FOOD WOULD RUN OUT BEFORE YOU GOT MONEY TO BUY MORE.: NEVER TRUE

## 2024-01-16 SDOH — ECONOMIC STABILITY: FOOD INSECURITY: WITHIN THE PAST 12 MONTHS, THE FOOD YOU BOUGHT JUST DIDN'T LAST AND YOU DIDN'T HAVE MONEY TO GET MORE.: NEVER TRUE

## 2024-01-16 ASSESSMENT — PATIENT HEALTH QUESTIONNAIRE - PHQ9
SUM OF ALL RESPONSES TO PHQ QUESTIONS 1-9: 0
SUM OF ALL RESPONSES TO PHQ9 QUESTIONS 1 & 2: 0
SUM OF ALL RESPONSES TO PHQ QUESTIONS 1-9: 0
SUM OF ALL RESPONSES TO PHQ9 QUESTIONS 1 & 2: 0
SUM OF ALL RESPONSES TO PHQ QUESTIONS 1-9: 0
1. LITTLE INTEREST OR PLEASURE IN DOING THINGS: 0
SUM OF ALL RESPONSES TO PHQ QUESTIONS 1-9: 0
2. FEELING DOWN, DEPRESSED OR HOPELESS: NOT AT ALL
SUM OF ALL RESPONSES TO PHQ QUESTIONS 1-9: 0
SUM OF ALL RESPONSES TO PHQ9 QUESTIONS 1 & 2: 0
SUM OF ALL RESPONSES TO PHQ QUESTIONS 1-9: 0
1. LITTLE INTEREST OR PLEASURE IN DOING THINGS: 0
1. LITTLE INTEREST OR PLEASURE IN DOING THINGS: NOT AT ALL
2. FEELING DOWN, DEPRESSED OR HOPELESS: 0
2. FEELING DOWN, DEPRESSED OR HOPELESS: 0

## 2024-01-16 ASSESSMENT — ENCOUNTER SYMPTOMS
SORE THROAT: 0
SHORTNESS OF BREATH: 0
CHEST TIGHTNESS: 0
RHINORRHEA: 0
CONSTIPATION: 0
ABDOMINAL PAIN: 0
DIARRHEA: 0

## 2024-01-16 NOTE — PROGRESS NOTES
Well Adult Note  Name: Renata Guillermo Today’s Date: 2024   MRN: [id number] Sex: Female   Age: 28 y.o. Ethnicity:  /    : 1995 Race: White (non-)      Renata Guillermo is here for well adult exam.  History:  Here for a physical- no concerns today.   Been on Sprintec over 10 years.  Due for pap smear.    Review of Systems   Constitutional:  Negative for activity change, appetite change, fatigue and fever.   HENT:  Negative for congestion, rhinorrhea and sore throat.    Respiratory:  Negative for chest tightness and shortness of breath.    Cardiovascular:  Negative for chest pain, palpitations and leg swelling.   Gastrointestinal:  Negative for abdominal pain, constipation and diarrhea.   Genitourinary:  Negative for dysuria and frequency.   Musculoskeletal:  Negative for arthralgias.   Neurological:  Negative for dizziness, weakness and headaches.   Psychiatric/Behavioral:  Negative for hallucinations.    All other systems reviewed and are negative.    Allergies   Allergen Reactions    Doxycycline Nausea And Vomiting    Gold Other (See Comments), Hives and Rash     Other reaction(s): Skin Discoloration  urticaria         Prior to Visit Medications    Medication Sig Taking? Authorizing Provider   Norgestim-Eth Estrad Triphasic (TRI-SPRINTEC) 0.18/0.215/0.25 MG-35 MCG TABS TAKE 1 TABLET BY MOUTH DAILY Yes Dai Liu MD   ivabradine (CORLANOR) 5 MG TABS tablet TAKE 1 TABLET BY MOUTH TWICE DAILY WITH MEALS Yes Felicity Cox MD   verapamil (CALAN SR) 120 MG extended release tablet TAKE 1 TABLET BY MOUTH DAILY Yes Vee Sahu APRN - CNP   flecainide (TAMBOCOR) 50 MG tablet Take 1 tablet by mouth 2 times daily Yes Laura Rolle APRN - CNP   ibuprofen (ADVIL;MOTRIN) 800 MG tablet  Yes ProviderBrittanie MD   cetirizine (ZYRTEC) 10 MG tablet Take 1 tablet by mouth daily Yes ProviderBrittanie MD       Past Medical History:   Diagnosis Date    AVNRT (AV

## 2024-01-17 LAB
BASOPHILS # BLD: 0 K/UL (ref 0–0.2)
BASOPHILS NFR BLD: 0 %
DEPRECATED RDW RBC AUTO: 12.3 % (ref 12.4–15.4)
EOSINOPHIL # BLD: 0.2 K/UL (ref 0–0.6)
EOSINOPHIL NFR BLD: 5 %
EST. AVERAGE GLUCOSE BLD GHB EST-MCNC: 82.5 MG/DL
HBA1C MFR BLD: 4.5 %
HCT VFR BLD AUTO: 39.8 % (ref 36–48)
HGB BLD-MCNC: 14 G/DL (ref 12–16)
LYMPHOCYTES # BLD: 1.9 K/UL (ref 1–5.1)
LYMPHOCYTES NFR BLD: 59 %
MCH RBC QN AUTO: 31.8 PG (ref 26–34)
MCHC RBC AUTO-ENTMCNC: 35.2 G/DL (ref 31–36)
MCV RBC AUTO: 90.5 FL (ref 80–100)
MONOCYTES # BLD: 0.2 K/UL (ref 0–1.3)
MONOCYTES NFR BLD: 6 %
NEUTROPHILS # BLD: 1 K/UL (ref 1.7–7.7)
NEUTROPHILS NFR BLD: 30 %
PLATELET # BLD AUTO: 242 K/UL (ref 135–450)
PMV BLD AUTO: 8.8 FL (ref 5–10.5)
RBC # BLD AUTO: 4.4 M/UL (ref 4–5.2)
RBC MORPH BLD: NORMAL
SLIDE REVIEW: ABNORMAL
WBC # BLD AUTO: 3.3 K/UL (ref 4–11)

## 2024-01-18 PROCEDURE — 93298 REM INTERROG DEV EVAL SCRMS: CPT | Performed by: INTERNAL MEDICINE

## 2024-02-06 DIAGNOSIS — R00.2 PALPITATIONS: ICD-10-CM

## 2024-02-06 NOTE — TELEPHONE ENCOUNTER
Requested Prescriptions     Pending Prescriptions Disp Refills    verapamil (CALAN SR) 120 MG extended release tablet 90 tablet 1     Sig: Take 1 tablet by mouth daily          Number: 90    Refills: 3    Last Office Visit: 8/22/2023     Next Office Visit: 2/22/2024     Last Labs: orders placed-not completed as yet   Last EKG 8/22/2023

## 2024-02-23 NOTE — PROGRESS NOTES
[facility]   Electrophysiology  Office Visit  Date: 3/6/2024    Chief Complaint   Patient presents with    Loss of Consciousness    Palpitations    Tachycardia     Cardiac HX: eRnata Guillermo is a 28 y.o. woman with a h/o palpitations, SVT/AVNRT, s/p EPS/RFA of AVNRT (7/18/2016, [facility]), POTS, s/p + TTT (7/30/2019), placed on metoprolol, dx'd w/ EDS (2019), follows with onc, recurrent palpitations, monitor (2/2020) showed unifocal s/s PVCs, placed on verapamil, repeat monitor (4/2020) showed occ PVCs and tachycardia, placed on Corlanor 5 mg BID, echo (6/2020) WNL, placed on flecainide (6/2020), s/p ILR (3/8/2021, Dr. Richards).     Interval History/HPI: Patient is here for follow-up for palpitations, SVT/AVNRT, PVCs and inappropriate sinus tach.  Patient has a longstanding history of palpitations.  She underwent an EP study in July 2016 and was [facility]  She had complaints of syncope and tachycardia and underwent a tilt table test in July 2019 and was found to be positive for POTS at that time.  She was placed on metoprolol.  She developed recurrent palpitations and wore monitor in February 2020 that showed unifocal symptomatic PVCs and she was placed on verapamil.  She wore repeat monitor in April 2020 that showed occasional PVC and sinus tachycardia.  She was placed on Corlanor 5 mg twice a day.  She had an echo in June 2020 that was within normal limits.  She was placed on flecainide in June 2020.  She was then implanted with an ILR March 2021.  Today she presents in NSR. She has not felt any heart racing, palpitations or irregular heart beats.   She remains on flecainide 50 mg twice a day, Corlanor 50 mg twice a day and verapamil 120 mg daily.  Review of device today shows no patient or device activated events.  She denies any chest pain, shortness of breath, PND, orthopnea or lower extremity edema.  She is hydrating and exercising.      Home medications:   Current Outpatient

## 2024-03-06 ENCOUNTER — OFFICE VISIT (OUTPATIENT)
Dept: CARDIOLOGY CLINIC | Age: 29
End: 2024-03-06
Payer: COMMERCIAL

## 2024-03-06 VITALS
DIASTOLIC BLOOD PRESSURE: 72 MMHG | SYSTOLIC BLOOD PRESSURE: 114 MMHG | HEART RATE: 66 BPM | BODY MASS INDEX: 27.36 KG/M2 | WEIGHT: 144.8 LBS

## 2024-03-06 DIAGNOSIS — R55 SYNCOPE AND COLLAPSE: ICD-10-CM

## 2024-03-06 DIAGNOSIS — R00.0 TACHYCARDIA: ICD-10-CM

## 2024-03-06 DIAGNOSIS — Z95.818 IMPLANTABLE LOOP RECORDER PRESENT: ICD-10-CM

## 2024-03-06 DIAGNOSIS — G90.A POTS (POSTURAL ORTHOSTATIC TACHYCARDIA SYNDROME): ICD-10-CM

## 2024-03-06 DIAGNOSIS — R00.2 PALPITATIONS: ICD-10-CM

## 2024-03-06 DIAGNOSIS — I47.11 INAPPROPRIATE SINUS TACHYCARDIA: Primary | ICD-10-CM

## 2024-03-06 PROCEDURE — 99214 OFFICE O/P EST MOD 30 MIN: CPT | Performed by: NURSE PRACTITIONER

## 2024-03-06 PROCEDURE — 93000 ELECTROCARDIOGRAM COMPLETE: CPT | Performed by: NURSE PRACTITIONER

## 2024-03-14 DIAGNOSIS — Z30.41 ORAL CONTRACEPTIVE PILL SURVEILLANCE: ICD-10-CM

## 2024-03-15 RX ORDER — NORGESTIMATE AND ETHINYL ESTRADIOL 7DAYSX3 28
KIT ORAL
Qty: 28 TABLET | Refills: 2 | Status: SHIPPED | OUTPATIENT
Start: 2024-03-15

## 2024-03-15 NOTE — TELEPHONE ENCOUNTER
Medication:   Requested Prescriptions     Pending Prescriptions Disp Refills    Norgestim-Eth Estrad Triphasic (TRI-SPRINTEC) 0.18/0.215/0.25 MG-35 MCG TABS 28 tablet 2     Sig: TAKE 1 TABLET BY MOUTH DAILY        Last Filled:  12/14/2023 #28 2 refills    Patient Phone Number: [phone] (home)     Last appt: 1/16/2024   Next appt: Visit date not found

## 2024-04-16 DIAGNOSIS — Z30.41 ORAL CONTRACEPTIVE PILL SURVEILLANCE: ICD-10-CM

## 2024-04-16 RX ORDER — NORGESTIMATE AND ETHINYL ESTRADIOL 7DAYSX3 28
KIT ORAL
Qty: 28 TABLET | Refills: 2 | OUTPATIENT
Start: 2024-04-16

## 2024-05-15 RX ORDER — FLECAINIDE ACETATE 50 MG/1
50 TABLET ORAL 2 TIMES DAILY
Qty: 180 TABLET | Refills: 3 | Status: SHIPPED | OUTPATIENT
Start: 2024-05-15

## 2024-06-11 DIAGNOSIS — Z30.41 ORAL CONTRACEPTIVE PILL SURVEILLANCE: ICD-10-CM

## 2024-06-11 RX ORDER — NORGESTIMATE AND ETHINYL ESTRADIOL 7DAYSX3 28
KIT ORAL
Qty: 28 TABLET | Refills: 2 | Status: SHIPPED | OUTPATIENT
Start: 2024-06-11

## 2024-06-11 NOTE — TELEPHONE ENCOUNTER
Medication:   Requested Prescriptions     Pending Prescriptions Disp Refills    Norgestim-Eth Estrad Triphasic (TRI-SPRINTEC) 0.18/0.215/0.25 MG-35 MCG TABS 28 tablet 2     Sig: TAKE 1 TABLET BY MOUTH DAILY        Last Filled:  3/15/24 #28 2 refill    Patient Phone Number: [phone] (home)     Last appt: 1/16/2024   Next appt: Visit date not found

## 2024-09-03 DIAGNOSIS — Z30.41 ORAL CONTRACEPTIVE PILL SURVEILLANCE: ICD-10-CM

## 2024-09-03 RX ORDER — NORGESTIMATE AND ETHINYL ESTRADIOL 7DAYSX3 28
KIT ORAL
Qty: 28 TABLET | Refills: 2 | Status: SHIPPED | OUTPATIENT
Start: 2024-09-03

## 2024-09-03 NOTE — TELEPHONE ENCOUNTER
Medication:   Requested Prescriptions     Pending Prescriptions Disp Refills    Norgestim-Eth Estrad Triphasic (TRI-SPRINTEC) 0.18/0.215/0.25 MG-35 MCG TABS [Pharmacy Med Name: TRI-SPRINTEC TABLETS 28S] 28 tablet 2     Sig: TAKE 1 TABLET BY MOUTH DAILY        Last Filled:  6/11/24 #28 2 refills    Patient Phone Number: [phone] (home)     Last appt: 1/16/2024   Next appt: 1/16/2025

## 2024-09-17 PROCEDURE — 93298 REM INTERROG DEV EVAL SCRMS: CPT | Performed by: INTERNAL MEDICINE

## 2024-10-19 PROCEDURE — 93298 REM INTERROG DEV EVAL SCRMS: CPT | Performed by: INTERNAL MEDICINE

## 2024-11-06 NOTE — PROGRESS NOTES
would like to leave it in for now.  She is aware that it can be removed at any time.  She has had no dizziness, lightheadedness or syncopal events.  She does hydrate.  She has recently changed positions and feels like she is under a lot less stress.  Patient denies CP, SOB, PND, orthopnea or LE edema.       Home medications:   Current Outpatient Medications on File Prior to Visit   Medication Sig Dispense Refill    ketoconazole (NIZORAL) 2 % shampoo Apply topically daily as needed for Itching      desonide (DESOWEN) 0.05 % cream Apply topically as needed      clobetasol (TEMOVATE) 0.05 % cream Apply topically as needed      Norgestim-Eth Estrad Triphasic (TRI-SPRINTEC) 0.18/0.215/0.25 MG-35 MCG TABS TAKE 1 TABLET BY MOUTH DAILY 28 tablet 2    flecainide (TAMBOCOR) 50 MG tablet Take 1 tablet by mouth 2 times daily 180 tablet 3    verapamil (CALAN SR) 120 MG extended release tablet Take 1 tablet by mouth daily 90 tablet 3    ibuprofen (ADVIL;MOTRIN) 800 MG tablet as needed      cetirizine (ZYRTEC) 10 MG tablet Take 1 tablet by mouth daily       No current facility-administered medications on file prior to visit.       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    EDS (Vianey-Danlos syndrome)     Headache     Inappropriate sinus tachycardia ([facility])     Neutropenia ([facility]) 2016    PVC (premature ventricular contraction)     Scoliosis 2018    SI (sacroiliac) joint dysfunction 2018    Syncope         Past Surgical History:   Procedure Laterality Date    COLONOSCOPY      INSERTABLE CARDIAC MONITOR  2016    electrophysiology    LYMPH NODE DISSECTION  2016    groin and neck - enlarged    TONSILLECTOMY      TONSILLECTOMY AND ADENOIDECTOMY  2002       Allergies   Allergen Reactions    Doxycycline Nausea And Vomiting    Gold Other (See Comments), Hives and Rash     Other reaction(s): Skin Discoloration  urticaria         Social History:  Reviewed.  reports that she has never smoked.

## 2024-11-25 ENCOUNTER — NURSE ONLY (OUTPATIENT)
Dept: CARDIOLOGY CLINIC | Age: 29
End: 2024-11-25

## 2024-11-25 ENCOUNTER — OFFICE VISIT (OUTPATIENT)
Dept: CARDIOLOGY CLINIC | Age: 29
End: 2024-11-25

## 2024-11-25 VITALS
SYSTOLIC BLOOD PRESSURE: 110 MMHG | HEART RATE: 81 BPM | BODY MASS INDEX: 29.21 KG/M2 | DIASTOLIC BLOOD PRESSURE: 74 MMHG | WEIGHT: 154.6 LBS

## 2024-11-25 DIAGNOSIS — R55 SYNCOPE AND COLLAPSE: ICD-10-CM

## 2024-11-25 DIAGNOSIS — R00.2 PALPITATIONS: ICD-10-CM

## 2024-11-25 DIAGNOSIS — Z95.818 IMPLANTABLE LOOP RECORDER PRESENT: ICD-10-CM

## 2024-11-25 DIAGNOSIS — I47.19 AVNRT (AV NODAL RE-ENTRY TACHYCARDIA) (HCC): ICD-10-CM

## 2024-11-25 DIAGNOSIS — Z51.81 ENCOUNTER FOR MONITORING FLECAINIDE THERAPY: ICD-10-CM

## 2024-11-25 DIAGNOSIS — I47.11 INAPPROPRIATE SINUS TACHYCARDIA (HCC): Primary | ICD-10-CM

## 2024-11-25 DIAGNOSIS — Z79.899 ENCOUNTER FOR MONITORING FLECAINIDE THERAPY: ICD-10-CM

## 2024-11-25 DIAGNOSIS — G90.A POTS (POSTURAL ORTHOSTATIC TACHYCARDIA SYNDROME): ICD-10-CM

## 2024-11-25 DIAGNOSIS — I49.3 PVC'S (PREMATURE VENTRICULAR CONTRACTIONS): ICD-10-CM

## 2024-11-25 DIAGNOSIS — Z45.09 ENCOUNTER FOR LOOP RECORDER CHECK: Primary | Chronic | ICD-10-CM

## 2024-11-25 RX ORDER — CLOBETASOL PROPIONATE 0.5 MG/G
CREAM TOPICAL PRN
COMMUNITY

## 2024-11-25 RX ORDER — IVABRADINE 5 MG/1
TABLET, FILM COATED ORAL
Qty: 180 TABLET | Refills: 3 | Status: SHIPPED | OUTPATIENT
Start: 2024-11-25

## 2024-11-25 RX ORDER — DESONIDE 0.5 MG/G
CREAM TOPICAL PRN
COMMUNITY
Start: 2024-10-16

## 2024-11-25 RX ORDER — KETOCONAZOLE 20 MG/ML
SHAMPOO, SUSPENSION TOPICAL DAILY PRN
COMMUNITY
Start: 2024-10-16

## 2025-01-04 DIAGNOSIS — Z30.41 ORAL CONTRACEPTIVE PILL SURVEILLANCE: ICD-10-CM

## 2025-01-05 RX ORDER — NORGESTIMATE AND ETHINYL ESTRADIOL 7DAYSX3 28
KIT ORAL
Qty: 28 TABLET | Refills: 2 | Status: SHIPPED | OUTPATIENT
Start: 2025-01-05

## 2025-01-16 ENCOUNTER — TELEMEDICINE (OUTPATIENT)
Dept: PRIMARY CARE CLINIC | Age: 30
End: 2025-01-16
Payer: COMMERCIAL

## 2025-01-16 DIAGNOSIS — A08.4 VIRAL GASTROENTERITIS: Primary | ICD-10-CM

## 2025-01-16 PROBLEM — R59.1 LYMPHADENOPATHY: Status: ACTIVE | Noted: 2017-05-26

## 2025-01-16 PROCEDURE — 99214 OFFICE O/P EST MOD 30 MIN: CPT | Performed by: FAMILY MEDICINE

## 2025-01-16 RX ORDER — ONDANSETRON 4 MG/1
4 TABLET, ORALLY DISINTEGRATING ORAL 3 TIMES DAILY PRN
Qty: 21 TABLET | Refills: 0 | Status: SHIPPED | OUTPATIENT
Start: 2025-01-16

## 2025-01-16 RX ORDER — DIPHENOXYLATE HYDROCHLORIDE AND ATROPINE SULFATE 2.5; .025 MG/1; MG/1
1 TABLET ORAL 4 TIMES DAILY PRN
Qty: 20 TABLET | Refills: 0 | Status: SHIPPED | OUTPATIENT
Start: 2025-01-16 | End: 2025-01-21

## 2025-01-16 SDOH — ECONOMIC STABILITY: TRANSPORTATION INSECURITY
IN THE PAST 12 MONTHS, HAS LACK OF TRANSPORTATION KEPT YOU FROM MEETINGS, WORK, OR FROM GETTING THINGS NEEDED FOR DAILY LIVING?: NO

## 2025-01-16 SDOH — ECONOMIC STABILITY: FOOD INSECURITY: WITHIN THE PAST 12 MONTHS, THE FOOD YOU BOUGHT JUST DIDN'T LAST AND YOU DIDN'T HAVE MONEY TO GET MORE.: NEVER TRUE

## 2025-01-16 SDOH — ECONOMIC STABILITY: INCOME INSECURITY: IN THE LAST 12 MONTHS, WAS THERE A TIME WHEN YOU WERE NOT ABLE TO PAY THE MORTGAGE OR RENT ON TIME?: NO

## 2025-01-16 SDOH — ECONOMIC STABILITY: FOOD INSECURITY: WITHIN THE PAST 12 MONTHS, YOU WORRIED THAT YOUR FOOD WOULD RUN OUT BEFORE YOU GOT MONEY TO BUY MORE.: NEVER TRUE

## 2025-01-16 SDOH — ECONOMIC STABILITY: TRANSPORTATION INSECURITY
IN THE PAST 12 MONTHS, HAS THE LACK OF TRANSPORTATION KEPT YOU FROM MEDICAL APPOINTMENTS OR FROM GETTING MEDICATIONS?: NO

## 2025-01-16 ASSESSMENT — PATIENT HEALTH QUESTIONNAIRE - PHQ9
2. FEELING DOWN, DEPRESSED OR HOPELESS: NOT AT ALL
SUM OF ALL RESPONSES TO PHQ QUESTIONS 1-9: 0
1. LITTLE INTEREST OR PLEASURE IN DOING THINGS: NOT AT ALL
SUM OF ALL RESPONSES TO PHQ9 QUESTIONS 1 & 2: 0

## 2025-01-16 ASSESSMENT — ENCOUNTER SYMPTOMS
SORE THROAT: 0
RHINORRHEA: 0
ABDOMINAL PAIN: 0
DIARRHEA: 1
NAUSEA: 1
VOMITING: 1
SHORTNESS OF BREATH: 0
CHEST TIGHTNESS: 0
CONSTIPATION: 0

## 2025-01-16 NOTE — PROGRESS NOTES
Subjective:   Patient ID: Renata Guillermo is a 29 y.o. female.  HPI by clinical support staff:   Chief Complaint   Patient presents with    Nausea & Vomiting     Started this morning    Diarrhea     Started this morning    Chills     Has not checked temp      Preliminary data above this line collected by clinical support staff.    ______________________________________________________________________  HPI by Provider:   HPI   Patient presents today virtually with complaint of nausea and vomiting that started this morning states she has had at least 4 episodes nonbloody nonbilious emesis.  Also reports watery diarrhea without mucus or blood-about 4 episodes as well.  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  Does have a tactile fever also reports myalgia has not tested for COVID or flu.  Trying to stay well-hydrated.   Data above this line collected by Provider.    Patient's medications, allergies, past medical, surgical, social and family histories were reviewed and updated as appropriate.  Patient Care Team:  Dai Liu MD as PCP - General (Family Medicine)  [facility]  Jo Vivar MD as Consulting Physician (Obstetrics & Gynecology)  Current Outpatient Medications on File Prior to Visit   Medication Sig Dispense Refill    Norgestim-Eth Estrad Triphasic (TRI-SPRINTEC) 0.18/0.215/0.25 MG-35 MCG TABS TAKE 1 TABLET BY MOUTH DAILY 28 tablet 2    ketoconazole (NIZORAL) 2 % shampoo Apply topically daily as needed for Itching      desonide (DESOWEN) 0.05 % cream Apply topically as needed      clobetasol (TEMOVATE) 0.05 % cream Apply topically as needed      ivabradine (CORLANOR) 5 MG TABS tablet TAKE 1 TABLET BY MOUTH TWICE DAILY WITH MEALS 180 tablet 3    flecainide (TAMBOCOR) 50 MG tablet Take 1 tablet by mouth 2 times daily 180 tablet 3    verapamil (CALAN SR) 120 MG extended release tablet Take 1

## 2025-01-17 ENCOUNTER — TELEPHONE (OUTPATIENT)
Dept: PRIMARY CARE CLINIC | Age: 30
End: 2025-01-17

## 2025-01-17 NOTE — TELEPHONE ENCOUNTER
ondansetron (ZOFRAN-ODT) 4 MG disintegrating tablet     Drug not covered  by patient plan.  The preferred alternative is ONDANSETFRONTAMBG    Please see attached

## 2025-01-18 RX ORDER — ONDANSETRON 4 MG/1
4 TABLET, FILM COATED ORAL DAILY PRN
Qty: 30 TABLET | Refills: 0 | Status: SHIPPED | OUTPATIENT
Start: 2025-01-18

## 2025-03-24 DIAGNOSIS — R00.2 PALPITATIONS: ICD-10-CM

## 2025-03-24 RX ORDER — VERAPAMIL HYDROCHLORIDE 120 MG/1
120 TABLET, FILM COATED, EXTENDED RELEASE ORAL DAILY
Qty: 90 TABLET | Refills: 3 | Status: SHIPPED | OUTPATIENT
Start: 2025-03-24

## 2025-03-24 NOTE — TELEPHONE ENCOUNTER
Requested Prescriptions     Pending Prescriptions Disp Refills    verapamil (CALAN SR) 120 MG extended release tablet 90 tablet 3     Sig: Take 1 tablet by mouth daily            Checked Correct Pharmacy: Yes  Any changes since last refill? No     Number: 90  Refills: 3    Last Fill Date: 2024    Last EK2024      Last OV: 2024 Provider: TRESSA  Next OV: 2025 Provider: OMID    Last Labs: 2024    CMP:   Lab Results   Component Value Date     2024    K 3.9 2024     2024    CO2 27 2024    BUN 12 2024    CREATININE 0.7 2024    GLUCOSE 83 2024    CALCIUM 9.0 2024    BILITOT 0.3 2024    ALKPHOS 45 2024    AST 20 2024    ALT 18 2024    LABGLOM >60 2024    GFRAA >60 2019    AGRATIO 1.4 2024    GLOB 3.1 2019

## 2025-06-17 RX ORDER — FLECAINIDE ACETATE 50 MG/1
50 TABLET ORAL 2 TIMES DAILY
Qty: 180 TABLET | Refills: 3 | Status: SHIPPED | OUTPATIENT
Start: 2025-06-17

## 2025-06-17 NOTE — TELEPHONE ENCOUNTER
Requested Prescriptions     Pending Prescriptions Disp Refills    flecainide (TAMBOCOR) 50 MG tablet 180 tablet 3     Sig: Take 1 tablet by mouth 2 times daily            Checked Correct Pharmacy: Yes    Any changes since last refill? No     Number: 180  Refills: 3    Last OV: 11/25/2024 Provider: TRESSA    Next OV: 7/24/2025 Provider: OMID    Last Labs:   CBC:  Lab Results   Component Value Date    WBC 3.3 (L) 01/16/2024    HGB 14.0 01/16/2024    HCT 39.8 01/16/2024    MCV 90.5 01/16/2024     01/16/2024    LYMPHOPCT 59.0 01/16/2024    RBC 4.40 01/16/2024    MCH 31.8 01/16/2024    MCHC 35.2 01/16/2024    RDW 12.3 (L) 01/16/2024           CMP:  Lab Results   Component Value Date     01/16/2024    K 3.9 01/16/2024     01/16/2024    CO2 27 01/16/2024    BUN 12 01/16/2024    CREATININE 0.7 01/16/2024    GLUCOSE 83 01/16/2024    CALCIUM 9.0 01/16/2024    BILITOT 0.3 01/16/2024    ALKPHOS 45 01/16/2024    AST 20 01/16/2024    ALT 18 01/16/2024    LABGLOM >60 01/16/2024    GFRAA >60 06/28/2019    AGRATIO 1.4 01/16/2024    GLOB 3.1 06/28/2019         TSH:  Lab Results   Component Value Date    TSH 1.26 06/28/2019    TSHFT4 1.35 01/16/2024

## 2025-07-18 DIAGNOSIS — Z30.41 ORAL CONTRACEPTIVE PILL SURVEILLANCE: ICD-10-CM

## 2025-07-18 RX ORDER — NORGESTIMATE AND ETHINYL ESTRADIOL 7DAYSX3 28
KIT ORAL
Qty: 28 TABLET | Refills: 2 | Status: SHIPPED | OUTPATIENT
Start: 2025-07-18

## 2025-07-18 NOTE — TELEPHONE ENCOUNTER
Medication:   Requested Prescriptions     Pending Prescriptions Disp Refills    Norgestim-Eth Estrad Triphasic (TRI-SPRINTEC) 0.18/0.215/0.25 MG-35 MCG TABS 28 tablet 2     Sig: TAKE 1 TABLET BY MOUTH DAILY        Last Filled:      Patient Phone Number: [phone] (home)     Last appt: 1/16/2025   Next appt: Visit date not found    Last OARRS:        No data to display

## 2025-07-18 NOTE — TELEPHONE ENCOUNTER
Medication and Quantity requested:   Norgestim-Eth Estrad Triphasic (TRI-SPRINTEC) 0.18/0.215/0.25 MG-35 MCG TABS       Last Visit  1/16/25    Pharmacy and phone number updated in [facility]:  yes  Sara